# Patient Record
Sex: FEMALE | Race: WHITE
[De-identification: names, ages, dates, MRNs, and addresses within clinical notes are randomized per-mention and may not be internally consistent; named-entity substitution may affect disease eponyms.]

---

## 2017-02-28 ENCOUNTER — HOSPITAL ENCOUNTER (OUTPATIENT)
Dept: HOSPITAL 45 - C.MAMM | Age: 72
Discharge: HOME | End: 2017-02-28
Attending: FAMILY MEDICINE
Payer: COMMERCIAL

## 2017-02-28 DIAGNOSIS — R92.0: ICD-10-CM

## 2017-02-28 DIAGNOSIS — Z12.31: Primary | ICD-10-CM

## 2017-02-28 NOTE — MAMMOGRAPHY REPORT
BILATERAL DIGITAL SCREENING MAMMOGRAM WITH CAD: 2/28/2017

CLINICAL HISTORY: Routine screening.  Patient has no complaints.  





TECHNIQUE: Bilateral CC and MLO views were obtained.  Current study was also evaluated with a Comput
er Aided Detection (CAD) system.  



COMPARISON: 06/19/2009, 03/18/2011, 06/06/2012, 07/21/2014 mammograms from [facility].  



BREAST COMPOSITION:  The tissue of both breasts is almost entirely fatty.  



FINDINGS:  There is a new faint grouping of microcalcifications in the lower inner anterior right br
east, warranting additional spot magnification views.



There are mild vascular calcifications in the breasts.  A few other benign coarse calcifications.  N
o other suspicious mass, architectural distortion or cluster of suspicious microcalcifications is se
en.  



IMPRESSION:  ACR BI-RADS CATEGORY 0: INCOMPLETE EVALUATION:  NEED ADDITIONAL IMAGING EVALUATION

The new faint grouping of microcalcifications in the right medial breast needs additional evaluation
.  

The patient will be called to schedule an appointment.  





Approximately 10% of breast cancers are not detected with mammography. A negative mammographic repor
t should not delay biopsy if a clinically suggestive mass is present.



Elsa Branch M.D.          

ay/:2/28/2017 15:24:06  



Imaging Technologist: Ariella PAN(CHANDAN)(M), [facility]

letter sent: Addl Imaging 0  

BI-RADS Code: ACR BI-RADS Category 0: Incomplete Evaluation:  Need Additional Imaging Evaluation

## 2017-03-10 ENCOUNTER — HOSPITAL ENCOUNTER (OUTPATIENT)
Dept: HOSPITAL 45 - C.MAMM | Age: 72
Discharge: HOME | End: 2017-03-10
Attending: FAMILY MEDICINE
Payer: COMMERCIAL

## 2017-03-10 DIAGNOSIS — R92.8: Primary | ICD-10-CM

## 2017-03-10 DIAGNOSIS — R92.1: ICD-10-CM

## 2017-03-10 NOTE — MAMMOGRAPHY REPORT
UNILATERAL RIGHT DIGITAL DIAGNOSTIC MAMMOGRAM: 3/10/2017

CLINICAL HISTORY: Callback from screening mammogram for right breast calcifications.  





TECHNIQUE:  Spot magnification right cc and ML views were obtained.



COMPARISON: Comparison is made to exams dated:  2/28/2017 mammogram - [facility], 
7/21/2014 mammogram, 6/6/2012 mammogram, and 3/18/2011 mammogram - [facility].   



BREAST COMPOSITION:  There are scattered areas of fibroglandular density in the right breast.  



FINDINGS:  Spot magnification views of the right breast demonstrate new grouped punctate calcificati
ons in the right upper inner quadrant, with total extent of the calcifications measuring approximate
ly 19 mm.  The calcifications are indeterminate and stereotactic biopsy is recommended for further e
valuation.





IMPRESSION:  ACR BI-RADS CATEGORY 4: SUSPICIOUS

Grouped punctate calcifications in the right upper inner quadrant.  The calcifications are indetermi
arianna and stereotactic biopsy is recommended for further evaluation.



A phone call was made to the physician's office to confirm faxed results were received.  The patient
 has been verbally notified of the results.  She tentatively scheduled the biopsy before leaving the
 department.





Approximately 10% of breast cancers are not detected with mammography. A negative mammographic repor
t should not delay biopsy if a clinically suggestive mass is present.



Lise Chaney M.D.          

ah/:3/10/2017 13:52:31  



Imaging Technologist: Elan PAN(CHANDAN)(CINDY), [facility]

letter sent: Abnormal 4/5  

BI-RADS Code: ACR BI-RADS Category 4: Suspicious

## 2017-03-14 NOTE — CODING QUERY NO DIAGNOSIS
TREATMENT RENDERED WITHOUT A DIAGNOSIS                                                  



Dr. Royal,



To promote full compliance with coding requirements relating to patient care, physician 
participation is requested in all cases of  uncertainty.  Please assist us with 
providing a diagnosis/symptom for the test(s) below:



A diagnosis/symptom was not documented on your Order.  A valid diagnosis/symptom is 
required to bill all insurances.



**Please remember that we are unable to code a diagnosis of rule out, probable, possible, 
questionable, or suspected.  



Tests that require a diagnosis:





* UNI RT CALL BACK TOMOSYNTHESIS      DIAGNOSIS:



* DIAG RT CALL BACK W/O CAD          DIAGNOSIS:





***DATE OF SERVICE: 3/10/17***





Provider Signature: _____________________________ Date: _________



Thank you  

Guru Donahue

[facility] Information Management

Phone:  [phone]

Fax:  [phone]



Once completed, please kindly fax back to [phone]



For questions please call [phone]

## 2017-03-22 ENCOUNTER — HOSPITAL ENCOUNTER (OUTPATIENT)
Dept: HOSPITAL 45 - C.MAMM | Age: 72
Discharge: HOME | End: 2017-03-22
Attending: FAMILY MEDICINE
Payer: COMMERCIAL

## 2017-03-22 DIAGNOSIS — R92.0: Primary | ICD-10-CM

## 2017-03-22 DIAGNOSIS — N60.91: ICD-10-CM

## 2017-03-22 NOTE — MAMMOGRAPHY REPORT
THIS REPORT HAS BEEN AMENDED.  

STEREOTACTIC GUIDED BIOPSY RIGHT BREAST: 3/22/2017

CLINICAL HISTORY: Indeterminate calcifications in the right upper inner quadrant.  



PATIENT CONSENT: The procedure, risks, benefits, and alternatives of stereotactic biopsy with clip p
lacement were discussed with the patient, and verbal and written consent was obtained.  A timeout wa
s performed immediately prior to the procedure.  



PROCEDURE DESCRIPTION: With stereotactic guidance, aseptic technique, and lidocaine as a local anest
hetic (1% lidocaine to anesthetize the skin and 1% lidocaine with epinephrine to anesthetize the lidya
per tissues), the area of concern was sampled multiple times with a 9-gauge vacuum-assisted biopsy n
eedle (Suros Eviva).  The path of approach was medial to lateral.  The specimen radiograph demonstra
raz calcifications to be present in the samples.  A metallic marker clip was placed at the biopsy si
te.  This was confirmed on postprocedure mammograms.  Direct pressure was applied at the biopsy site
 and hemostasis was readily achieved.  The patient tolerated the procedure without complication.  
e was given wound care instructions.  





COMPARISON: Comparison is made to exams dated:  3/10/2017 mammogram, 2/28/2017 mammogram - [facility], 7/21/2014 mammogram, and 6/6/2012 mammogram - [facility].   







IMPRESSION: STEREOTACTIC GUIDED BIOPSY

Stereotactic biopsy of indeterminate calcifications in the right upper inner quadrant, with clip jf
cement.  The patient will receive pathology results from her referring provider.





Lise Chaney M.D.  

ah/:3/22/2017 11:22:47  



Imaging Technologist: Elan PAN(R)(M), [facility]









AMENDMENT: 3/30/2017   Lise Chaney M.D. 

The pathology from right breast stereotactic biopsy was reviewed on 3/30/2017.  The pathology showed
 atypical ductal hyperplasia with associated microcalcifications, which is concordant with the imagi
 findings.  Given the presence of atypia, surgical excision is recommended.

## 2017-03-22 NOTE — DISCHARGE INSTRUCTIONS
Discharge Instructions


Procedure


Procedure Date:


Mar 22, 2017.


Reason for visit:


Right Calcs.





Discharge


Discharge Date:


Mar 22, 2017.


Discharge Diagnosis:


status post breast biopsy





Instructions


Activity Recommendations:  Additional Limitations (see below)


Return to School/Work:  no limitations


Recommended Home Diet:  No Limitations


Provider Instructions:





ACTIVITY RECOMMENDATIONS:





*  No lifting, pushing, pulling or exercising the affected side for three days.








RETURN TO SCHOOL/WORK:





*  You may return to work/school after the procedure, but do not perform any 

strenuous


   activities for 24 to 48 hours.








MEDICATIONS:





*  Tylenol (two 325 mg) every four to six hours if needed for mild pain (if not 

allergic to Tylenol).








DIET:





*  Resume previous diet.








SPECIAL CARE INSTRUCTIONS:





*  Keep biopsy site dry for 24 hours.  May shower after 24 hours, but do not 

soak (bathe)


   incision.





*  May remove Tegaderm (plastic patch) tomorrow AFTER showering.





*  Leave the steri-strips on for one week.  Allow the steri-strips to fall off 

by themselves.  


   If not off after one week, you may remove them.  You may place a Bandaid


   crosswise over the strips, if desired.





*  Apply ice 10 minutes on and 10 minutes off as needed.





*  Wear a bra at bedtime to sleep more comfortably for 2-3 days.





*  Your referring physician should have the results after approximately 5 to 7 

business days.





*  Call for unusual bleeding, fever, drainage, etc or if you have any questions 

call


    [phone] during normal business hours or after hours call Dr Chaney, [phone].








FOLLOW UP VISIT:





Follow-up with Referring Physician as scheduled.





Allergies


Coded Allergies:  


     Sulfa Drugs (Verified  Allergy, Intermediate, RASH, 9/27/16)


[facility] Recommendations:


 


Call your doctor if:


*  Temperature above 101 degrees


*  Pain not relieved by pain medicine ordered


*  There is increased drainage or redness from any incision


*  You have any unanswered questions or concerns.





Your Doctors Instructions noted above were prepared by provider Lise Chaney.


Patient Signature Section:


 Patient Instructions Signature Page








Ursula West 











Patient (or Guardian) Signature/Date:____________________________________ I 

have read and understand the instructions given to me by my caregivers.








Caregiver/RN/Doctor Signature/Date:____________________________________








The above-named patient and/or guardian has received patient instructions on 

this date.


























+  Original Patient Signature Page (only) stays with chart.  Please make copy 

for patient.

## 2017-03-22 NOTE — MAMMOGRAPHY REPORT
UNILATERAL RIGHT DIGITAL DIAGNOSTIC MAMMOGRAM: 3/22/2017

CLINICAL HISTORY: Status post stereotactic biopsy of calcifications in the right upper inner quadran
t.  





TECHNIQUE:  Postprocedural right CC and ML views were obtained.



COMPARISON: Comparison is made to exams dated:  3/10/2017 mammogram, 2/28/2017 mammogram - [facility], 7/21/2014 mammogram, 6/6/2012 mammogram, and 3/18/2011 mammogram - [facility].   



BREAST COMPOSITION:  There are scattered areas of fibroglandular density in the right breast.  



FINDINGS:  

A new biopsy marker clip is seen at the site of the biopsied calcifications in the right upper inner
 quadrant.  No significant postbiopsy hematoma is seen.



IMPRESSION:  POST PROCEDURE IMAGING FOR MARKER PLACEMENT

New biopsy marker clip status post stereotactic biopsy of right upper inner quadrant calcifications.
  Pathology results are pending.



Approximately 10% of breast cancers are not detected with mammography. A negative mammographic repor
t should not delay biopsy if a clinically suggestive mass is present.



Lise Chaney M.D.          

ah/:3/22/2017 11:43:36  



Imaging Technologist: Elan PAN(CHANDAN)(M), [facility]



BI-RADS Code: Post Procedure Imaging For Marker Placement

## 2017-05-26 ENCOUNTER — HOSPITAL ENCOUNTER (INPATIENT)
Dept: HOSPITAL 45 - C.ED | Age: 72
LOS: 4 days | Discharge: HOME HEALTH | DRG: 65 | End: 2017-05-30
Attending: HOSPITALIST | Admitting: HOSPITALIST
Payer: COMMERCIAL

## 2017-05-26 VITALS
OXYGEN SATURATION: 96 % | SYSTOLIC BLOOD PRESSURE: 164 MMHG | HEART RATE: 59 BPM | TEMPERATURE: 97.7 F | DIASTOLIC BLOOD PRESSURE: 79 MMHG

## 2017-05-26 VITALS
DIASTOLIC BLOOD PRESSURE: 78 MMHG | OXYGEN SATURATION: 96 % | TEMPERATURE: 97.7 F | HEART RATE: 74 BPM | SYSTOLIC BLOOD PRESSURE: 143 MMHG

## 2017-05-26 VITALS
HEIGHT: 64 IN | WEIGHT: 258.38 LBS | BODY MASS INDEX: 44.11 KG/M2 | HEIGHT: 64 IN | BODY MASS INDEX: 44.11 KG/M2 | WEIGHT: 258.38 LBS

## 2017-05-26 VITALS — OXYGEN SATURATION: 96 % | HEART RATE: 67 BPM

## 2017-05-26 DIAGNOSIS — K21.9: ICD-10-CM

## 2017-05-26 DIAGNOSIS — I71.2: ICD-10-CM

## 2017-05-26 DIAGNOSIS — Z96.653: ICD-10-CM

## 2017-05-26 DIAGNOSIS — I11.0: ICD-10-CM

## 2017-05-26 DIAGNOSIS — I50.32: ICD-10-CM

## 2017-05-26 DIAGNOSIS — G47.33: ICD-10-CM

## 2017-05-26 DIAGNOSIS — I63.40: Primary | ICD-10-CM

## 2017-05-26 DIAGNOSIS — E03.9: ICD-10-CM

## 2017-05-26 LAB
ANION GAP SERPL CALC-SCNC: 19 MMOL/L (ref 16–25)
ANION GAP SERPL CALC-SCNC: 7 MMOL/L (ref 3–11)
BASOPHILS # BLD: 0.09 K/UL (ref 0–0.2)
BASOPHILS NFR BLD: 0.8 %
BUN SERPL-MCNC: 29 MG/DL (ref 7–18)
BUN/CREAT SERPL: 30.2 (ref 10–20)
CA-I BLD-SCNC: 1.19 MMOL/L (ref 1.12–1.32)
CALCIUM SERPL-MCNC: 9.2 MG/DL (ref 8.5–10.1)
CHLORIDE BLD-SCNC: 102 MEQ/L (ref 101–112)
CHLORIDE SERPL-SCNC: 106 MMOL/L (ref 98–107)
CKMB/CK RATIO: 1.7 (ref 0–3)
CO2 SERPL-SCNC: 30 MMOL/L (ref 21–32)
COMPLETE: YES
CREAT BLD-MCNC: 0.8 MG/DL (ref 0.6–1.3)
CREAT CL PREDICTED SERPL C-G-VRATE: 71.1 ML/MIN
CREAT SERPL-MCNC: 0.95 MG/DL (ref 0.6–1.2)
EOSINOPHIL NFR BLD AUTO: 249 K/UL (ref 130–400)
GLUCOSE SERPL-MCNC: 144 MG/DL (ref 70–99)
HCT VFR BLD AUTO: 47 % (ref 37–47)
HCT VFR BLD CALC: 44.4 % (ref 37–47)
HGB BLD-MCNC: 16 G/DL (ref 12–16)
IG%: 0.3 %
IMM GRANULOCYTES NFR BLD AUTO: 35.8 %
INR PPP: 1 (ref 0.9–1.1)
ISTAT CARBON DIOXIDE: 28 MEQ/L (ref 24–31)
LYMPHOCYTES # BLD: 3.92 K/UL (ref 1.2–3.4)
MCH RBC QN AUTO: 34.5 PG (ref 25–34)
MCHC RBC AUTO-ENTMCNC: 35.4 G/DL (ref 32–36)
MCV RBC AUTO: 97.6 FL (ref 80–100)
MONOCYTES NFR BLD: 7.7 %
NEUTROPHILS # BLD AUTO: 1.4 %
NEUTROPHILS NFR BLD AUTO: 54 %
PARTIAL THROMBOPLASTIN RATIO: 0.9
PMV BLD AUTO: 10.7 FL (ref 7.4–10.4)
POTASSIUM SERPL-SCNC: 4.2 MMOL/L (ref 3.5–5.1)
PROTHROMBIN TIME: 10.7 SECONDS (ref 9–12)
RBC # BLD AUTO: 4.55 M/UL (ref 4.2–5.4)
SODIUM BLD-SCNC: 143 MEQ/L (ref 135–144)
SODIUM SERPL-SCNC: 143 MMOL/L (ref 136–145)
WBC # BLD AUTO: 10.95 K/UL (ref 4.8–10.8)

## 2017-05-26 RX ADMIN — RANITIDINE SCH MG: 150 TABLET ORAL at 21:32

## 2017-05-26 RX ADMIN — OXYCODONE HYDROCHLORIDE AND ACETAMINOPHEN SCH MG: 500 TABLET ORAL at 21:31

## 2017-05-26 RX ADMIN — MELOXICAM SCH MG: 7.5 TABLET ORAL at 21:32

## 2017-05-26 RX ADMIN — SODIUM CHLORIDE SCH MLS/HR: 900 INJECTION, SOLUTION INTRAVENOUS at 21:30

## 2017-05-26 NOTE — DIAGNOSTIC IMAGING REPORT
CT SCAN OF THE BRAIN WITHOUT IV CONTRAST



CLINICAL HISTORY: Strokelike symptoms.



COMPARISON STUDY:  No priors.



TECHNIQUE: Unenhanced axial CT scan of the brain is performed from the vertex to

the skull base.



CT DOSE: 537.48 mGy.cm



FINDINGS:



Brain parenchyma: There are age-related involutional changes noting  mild

subcortical and periventricular microangiopathic change. There is no hemorrhage,

mass effect, or evidence of acute territorial ischemia by CT criteria.

Gray-white matter is preserved. No extra-axial fluid collection is seen.



Ventricles, sulci, cisterns: Prominent secondary to involutional change.



Intracranial vasculature: There is atherosclerotic calcification of the

cavernous carotid and vertebral arteries.



Calvarium: Unremarkable.



Sinuses and mastoids: The visualized paranasal sinuses are clear. The mastoid

air cells are well pneumatized.



Orbits: The bony orbits are grossly intact.





IMPRESSION: There is no hemorrhage, mass effect, or evidence of acute

territorial ischemia by CT criteria.







Electronically signed by:  Marshall Mayo M.D.

5/26/2017 2:21 PM



Dictated Date/Time:  5/26/2017 2:19 PM

## 2017-05-26 NOTE — HISTORY & PHYSICAL EXAMINATION
DATE OF ADMISSION:  05/26/2017

 

CHIEF COMPLAINT:  CVA.

 

HISTORY OF PRESENT ILLNESS:  This is a 72-year-old female with past medical

history significant for diastolic CHF, hypertension, history of gout,

hypothyroidism, GERD, aneurysm of ascending aorta, diverticular distal colon,

moderate obstructive sleep apnea, irritable bowel syndrome, presents with

garbled speech, right-sided facial droop and right-sided weakness.  The

symptoms started around 12 o'clock and patient initially felt weak in both

extremities.  When the family talked to her on the phone, she was having

garbled speech and she was brought in here and stroke alert was called and

initial CAT scan of the head was unremarkable.  Initially, the symptoms of

weakness improved and speech seemed to be improved . [place] Neurologist 
determined not to

give TPA because of her improving symptoms and because of her ascending

aortic aneurysm.  Currently after the patient got a dose of Ativan, 

patient's speech seems more garbled, but she is able to obey commands and

seems to understand the questions except for mild pronator drift in right

upper extremity, rest of the exams seems nonfocal.  Denies any chest pain, no

shortness of breath.  Denies any and nausea and denies any abdominal pain. 

Hemodynamically stable, afebrile.

 

ALLERGIES:  SULFA DRUGS.

 

PAST MEDICAL HISTORY:  As mentioned above.

 

PAST SURGICAL HISTORY:  Bilateral total knee arthroplasty, carpal tunnel

surgery, EGD with biopsies, excision of the breast lesion, radical

thyroid surgery for benign tumor, cholecystectomy, status post surgery for

colon perforation, total abdominal hysterectomy and bilateral

salpingo-oophorectomy and omentectomy for uterine cancer, umbilical hernia

repair.

 

MEDICATIONS:  The patient is on Norco 5 mg 1-2 tablets every 6 hours

p.r.n., lisinopril 40 mg p.o. daily, levothyroxine 125 mcg p.o. daily,

atenolol 50 mg p.o. daily, ranitidine 300 mg p.o. at bedtime, Lasix 40 mg

p.o. daily, potassium chloride ER 10 mEq p.o. daily, Meloxicam 50 mg p.o.

daily, omega 3 fatty acids 1 gram p.o. daily, Flonase 2 sprays in each

nostril daily, aspirin 81 mg p.o. daily, Ambien 5 mg p.o. at bedtime p.r.n.,

vitamin C 500 mg p.o. daily.

 

FAMILY HISTORY:  Significant for father had prostate cancer, hypertension. 

Mother had DVTs and hypertension.  Son has hypertension.  Sister has stomach

cancer.

 

SOCIAL HISTORY:  , lives with her .  Never smoked.  No alcohol

use.  No drug use.

 

REVIEW OF SYMPTOMS:  As per HPI.  Rest of review of symptoms negative.

 

PHYSICAL EXAMINATION:

GENERAL:  The patient is morbidly obese, currently having garbled speech.

VITAL SIGNS:  Temperature 37, pulse 73, respiratory rate 20, blood pressure

185/105 oxygen 98% on room air.

HEENT:  No pallor, no icterus.  Pupils equal, round, and reactive to light.

NECK:  No JVD, no neck masses, no carotid bruits.

CARDIOVASCULAR:  S1, S2 heard, regular rate and rhythm, no murmur, no gallop.

RESPIRATORY SYSTEM:  Normal AP diameter.  No accessory muscle use.  No

wheezing, no crackles.

ABDOMEN:  Soft, bowel sounds present.  Nontender.  No distention.

CENTRAL NERVOUS SYSTEM:  Speech is garbled.  Obeys commands strength 5/5

in the extremities.  Can elevate both the lower extremities.  Has mild

pronator drift on the right upper extremity.  Babinski negative.

EXTREMITIES:  Lower extremities, no edema, no erythema.

 

LABORATORIES:  Sodium 143, potassium 4.2, chloride 106, CO2 30, BUN 29,

creatinine 0.9, serum glucose 144, calcium 9.2.  Total creatine kinase 134. 

Troponin I less than 0.015.  WBC 10.9, hemoglobin 15.7, hematocrit 44.4,

platelets 249.  PT 10.7, INR 1, PTT 24.3.  Urinalysis is pending.

 

IMAGING DATA:  CT of the head - no hemorrhage, mass effect, or evidence of

acute ischemia by CT criteria.  Chest x-ray - mild  cardiomegaly,

otherwise no acute findings.  EKG showed normal sinus rhythm with rate of

62 no acute ST changes seen.

 

ASSESSMENT AND PLAN:  This 72-year-old female who presents with

cerebrovascular accident.

1.  Acute cerebrovascular accident involving right extremities and right facia 
droop and

garbled speech.  The patient still has a right facial droop and garbled

speech, but her weakness has improved.  Stroke alert was called in ER but TPA 
was 

not given secondary to her history of ascending thoracic aneurysm and as 
symptoms seemed

 improving.The patient was loaded with Plavix.  Continued home dose

of aspirin.  Permissive hypertension.  Neuro checks.  Will admit to tele floor.

MRI and MRA of the head, echocardiogram, carotid Dopplers.  Neurology

consulted and notified.  Speech evaluation and PT, OT. Gentle fluids. Close 
monitor on tele

floor for now.  

3.  History of sleep apnea.  Continue CPAP.

4.  History for hypertension.  gallito hold lisinopril for permissive HTN. continue 
atenolol.Hydralazine prn

5.  Hypothyroidism.  Continue Synthroid.

6.  History of gastroesophageal reflux disease, continue Zantac.

7.  History of diastolic congestive heart failure.  Continue her Lasix.  Will

monitor for any volume overload.

8.  Deep venous thrombosis prophylaxis, sequential compression devices for

now.

 

DISPOSITION:  Close monitor in tele floor.  Physical therapy and occupational

therapy prior to discharge.  Social Service to help with discharge planning. 

Level 1 full code.

 

 

 

MTDD

## 2017-05-26 NOTE — DIAGNOSTIC IMAGING REPORT
MRA OF THE INTRACRANIAL CIRCULATION WITHOUT CONTRAST



CLINICAL HISTORY: Stroke.    



COMPARISON STUDY: Head CT performed earlier today.



TECHNIQUE: Utilizing a 1.5 Farida magnet and 3-D time-of-flight technique,

unenhanced MRA of the intracranial circulation was obtained. 



FINDINGS: This exam is mildly compromised by motion artifact. No abrupt vessel

cut off is identified within the intracranial circulation. There is no

intracranial aneurysm although sensitivity for detection of small aneurysms is

diminished on this exam. There is mild intracranial vascular irregularity. There

is no severe stenosis within the intracranial vessels.



IMPRESSION:  



1. No abrupt vessel cut off. No aneurysm identified although sensitivity

diminished due to motion artifact.



2. Study mildly compromised by motion artifact.







Electronically signed by:  Aden Lizarraga M.D.

5/26/2017 8:38 PM



Dictated Date/Time:  5/26/2017 8:13 PM

## 2017-05-26 NOTE — DIAGNOSTIC IMAGING REPORT
MRA OF THE NECK WITH AND WITHOUT CONTRAST



CLINICAL HISTORY: Stroke    



COMPARISON STUDY: Carotid ultrasound May 26, 2017.



TECHNIQUE: Unenhanced and contrast-enhanced MRA of the neck was performed.

Injection of 12.5 mL of Magnevist IV was uneventful.  NASCET criteria were

utilized to estimate the degree of carotid stenosis. 



FINDINGS: This study is mildly compromised by motion artifact but is diagnostic.

There is no significant stenosis within the bilateral vertebral, common carotid

and internal carotid artery. These vessels are patent. There is mild

irregularity of the vertebral arteries which is likely due to atherosclerosis.

There is no evidence for dissection.



IMPRESSION:  No significant stenosis within the major vasculature of the neck.







Electronically signed by:  Aden Lizarraga M.D.

5/26/2017 8:38 PM



Dictated Date/Time:  5/26/2017 8:35 PM

## 2017-05-26 NOTE — DIAGNOSTIC IMAGING REPORT
CAROTID ARTERY ULTRASOUND



CLINICAL HISTORY: Stroke    



COMPARISON STUDY: None.



TECHNIQUE: Real-time, grayscale, and color Doppler sonography of the carotid and

vertebral arteries was performed. Images were viewed in the transverse and

longitudinal planes.



FINDINGS:



There is mild atherosclerotic plaque. Velocity measurements are listed below.



COMMON CAROTID PEAK SYSTOLIC VELOCITY (CM/S):  RIGHT 95  LEFT 68



ICA PEAK SYSTOLIC VELOCITY (CM/S):  RIGHT 86  LEFT 69





The systolic ratios between the internal to common carotid arteries are normal.





Antegrade flow is seen in the vertebral arteries. The external carotid arteries

are patent.



Blood pressure in the right arm measured 142/71.  Blood pressure in the left arm

measured 151/66.



IMPRESSION:  No evidence of a hemodynamically significant stenosis.







Electronically signed by:  Aden Lizarraga M.D.

5/26/2017 7:08 PM



Dictated Date/Time:  5/26/2017 7:07 PM

## 2017-05-26 NOTE — DIAGNOSTIC IMAGING REPORT
CHEST ONE VIEW PORTABLE



CLINICAL HISTORY: Stroke mental status change



COMPARISON STUDY:  9/27/2016



FINDINGS: Mild stable cardiomegaly. Diaphragms smooth. Lungs are clear. 



IMPRESSION:  Mild stable cardiomegaly. Otherwise negative study 









Electronically signed by:  Junior Hennessy M.D.

5/26/2017 2:52 PM



Dictated Date/Time:  5/26/2017 2:52 PM

## 2017-05-26 NOTE — DIAGNOSTIC IMAGING REPORT
MRI OF THE BRAIN WITHOUT AND WITH IV CONTRAST



CLINICAL HISTORY: Stroke.    



COMPARISON STUDY:  Head CT performed earlier today. 



TECHNIQUE:  Utilizing a 1.5 Farida magnet and dedicated coil, multiplanar,

multiecho imaging of the brain was performed pre and postcontrast

administration.  IV administration of 12 mL of Gadavist contrast was uneventful.



FINDINGS: There is a 2.7 x 1.5 cm focus of restricted diffusion within the left

frontotemporal region. There are a few smaller foci of restricted diffusion

within the left occipital lobe. There is no mass effect or evidence of

hemorrhagic conversion. Ventricular system is normal. Basilar cisterns are

patent. There are no extra-axial collections. Scattered white matter T2

hyperintense foci suggest moderate small vessel disease. There is no

intracranial mass. There is smooth diffuse dural enhancement. There is a 1.5 cm

right parietal scalp lesion which may demonstrate mild enhancement. 



IMPRESSION:  



1. 2.7 x 1.5 cm focus of restricted diffusion within left frontotemporal region

consistent with acute infarct. Several punctate foci of acute infarction within

the left occipital lobe. No mass effect or evidence of hemorrhagic conversion.



2. Mild diffuse smooth dural enhancement. This finding is entirely nonspecific

and of uncertain clinical significance. This could be seen in setting of

intracranial hypotension. However, the differential also includes neoplastic,

infectious and granulomatous processes.



3. Indeterminate 1.5 cm right parietal scalp lesion which could be correlated

with physical exam.







Electronically signed by:  Aden Lizarraga M.D.

5/26/2017 8:39 PM



Dictated Date/Time:  5/26/2017 8:25 PM

## 2017-05-26 NOTE — EMERGENCY ROOM VISIT NOTE
History


Report prepared by Sekouibphylicia:  Jose Alfredo Camargo


Under the Supervision of:  Dr. Jennifer Snell M.D.


First contact with patient:  14:03


Chief Complaint:  STROKE SYMPTOMS


Stated Complaint:  STROKE ALERT





History of Present Illness


The patient is a 72 year old female who presents to the Emergency Room with 

complaints of persistent stroke-like symptoms since 1215. The patient 

reportedly had right-sided extremity weakness, a right-sided facial droop, and 

garbled speech. The patient feels that her symptoms have improved slightly 

since arrival to the ED. Per nursing, the patient had one baby aspirin earlier 

today.





   Source of History:  patient


   Onset:  1215 today


   Position:  other (neuro)


   Quality:  other (stroke-like symptoms)


   Timing:  other (persistent)


   Associated Symptoms:  + weakness





Review of Systems


See HPI for pertinent positives & negatives. A total of 10 systems reviewed and 

were otherwise negative.





Past Medical & Surgical


Medical Problems:


(1) Ascending aortic aneurysm


(2) Chest pain


(3) CVA (cerebral vascular accident)


(4) Diastolic heart failure


(5) Dyslipidemia


(6) HTN (hypertension)


(7) Hypothyroidism


(8) ROB on CPAP


Surgical Problems:


(1) H/O thyroidectomy


(2) H/O umbilical hernia repair


(3) History of bilateral knee arthroplasty


(4) History of carpal tunnel surgery


(5) History of cholecystectomy


(6) History of partial colectomy


(7) History of total hysterectomy








Family History





No pertinent family history





Social History


Smoking Status:  Unknown if Ever Smoked


Alcohol Use:  none


Drug Use:  none


Marital Status:  


Housing Status:  lives with significant other


Occupation Status:  retired





Current/Historical Medications


Scheduled


Ascorbic Acid (Ascorbic Acid), 500 MG PO HS


Aspirin (Aspirin EC Low Dose), 81 MG PO DAILY


Atenolol (Tenormin), 50 MG PO HS


Fish Oil ([place]-3), 1 CAP PO DAILY


Fluticasone Propionate (Nasal) (Flonase Allergy Relief Ch), 2 SPRAYS INH DAILY


Furosemide (Lasix), 40 MG PO DAILY


Levothyroxine Sodium (Synthroid), 175 MCG PO QAM


Lisinopril (Lisinopril), 40 MG PO DAILY


Meloxicam (Mobic), 1 TAB PO HS


Potassium Chloride (Potassium Chloride Er), 1 CAP PO DAILY


Ranitidine (Zantac), 300 MG PO HS





Scheduled PRN


Zolpidem Tartrate (Ambien), 5 MG PO HS PRN for Insomnia





Allergies


Coded Allergies:  


     Sulfa Antibiotics (Verified  Allergy, Intermediate, RASH, 5/26/17)





Physical Exam


Vital Signs











  Date Time  Temp Pulse Resp B/P Pulse Ox O2 Delivery O2 Flow Rate FiO2


 


5/26/17 16:12  73 20 134/65 97 Room Air  


 


5/26/17 16:01  73 20 170/69 98 Room Air  


 


5/26/17 15:19  71 20 185/105 98 Room Air  


 


5/26/17 14:51  74 20 183/82 99 Room Air  


 


5/26/17 14:34     99 Room Air  


 


5/26/17 14:24  76      


 


5/26/17 14:10 37.0 74 20 173/96 99 Room Air  











Physical Exam


Vital signs reviewed.





General: Well-appearing female, in no significant distress. Noted to be 

slightly hypertensive.





HEENT: No scleral icterus, PERRLA, neck supple.  Atraumatic.





Cardiovascular: Regular rate and rhythm, no extra sounds.





Pulmonary: Clear to auscultation bilaterally, normal work of breathing.





Abdomen: Soft, nontender, nondistended, positive bowel sounds.





Musculoskeletal: Atraumatic, no peripheral edema.





Neurologic: Equal strength in all 4 extremities with equal  strength, no 

pronator drift, normal finger to nose exam, normal heel to shin exam, cranial 

nerve examination reveals mild labionasal folding on the right with some 

dysarthria, answers questions appropriately, equal  strength.





Skin: Warm, dry, no rash





Medical Decision & Procedures


ER Provider


Diagnostic Interpretation:


Radiology results as stated below per my review and radiologist interpretation:





CT SCAN OF THE BRAIN WITHOUT IV CONTRAST





CLINICAL HISTORY: Strokelike symptoms.





COMPARISON STUDY:  No priors.





TECHNIQUE: Unenhanced axial CT scan of the brain is performed from the vertex to


the skull base.





CT DOSE: 537.48 mGy.cm





FINDINGS:





Brain parenchyma: There are age-related involutional changes noting  mild


subcortical and periventricular microangiopathic change. There is no hemorrhage,


mass effect, or evidence of acute territorial ischemia by CT criteria.


Gray-white matter is preserved. No extra-axial fluid collection is seen.





Ventricles, sulci, cisterns: Prominent secondary to involutional change.





Intracranial vasculature: There is atherosclerotic calcification of the


cavernous carotid and vertebral arteries.





Calvarium: Unremarkable.





Sinuses and mastoids: The visualized paranasal sinuses are clear. The mastoid


air cells are well pneumatized.





Orbits: The bony orbits are grossly intact.








IMPRESSION: There is no hemorrhage, mass effect, or evidence of acute


territorial ischemia by CT criteria.











Electronically signed by:  Marshall Mayo M.D.


5/26/2017 2:21 PM





Dictated Date/Time:  5/26/2017 2:19 PM








CHEST ONE VIEW PORTABLE





CLINICAL HISTORY: Stroke mental status change





COMPARISON STUDY:  9/27/2016





FINDINGS: Mild stable cardiomegaly. Diaphragms smooth. Lungs are clear. 





IMPRESSION:  Mild stable cardiomegaly. Otherwise negative study 














Electronically signed by:  Junior Hennessy M.D.


5/26/2017 2:52 PM





Dictated Date/Time:  5/26/2017 2:52 PM





Laboratory Results











Test


  5/26/17


13:45 5/26/17


14:15 5/26/17


14:22 5/26/17


14:23


 


Total Creatine Kinase


  134 U/L


() 


  


  


 


 


Creatine Kinase MB


  2.3 ng/ml


(0.5-3.6) 


  


  


 


 


Creatine Kinase MB Ratio 1.7 (0-3.0)    


 


Bedside Hemoglobin


  


  16.0 g/dl


(12.0-16.0) 


  


 


 


Bedside Hematocrit  47 % (37-47)   


 


Bedside Sodium


  


  143 mEq/L


(135-144) 


  


 


 


Bedside Potassium


  


  4.2 mEq/L


(3.3-5.0) 


  


 


 


Bedside Chloride


  


  102 mEq/L


(101-112) 


  


 


 


Bedside Total CO2


  


  28 mEq/l


(24-31) 


  


 


 


Bedside Blood Urea Nitrogen


  


  30 mg/dl


(7-18) 


  


 


 


Bedside Creatinine


  


  0.8 mg/dl


(0.6-1.3) 


  


 


 


Bedside Glucose (other)


  


  144 mg/dl


(70-99) 


  


 


 


Bedside Ionized Calcium (Dom)


  


  1.19 mmol/l


(1.12-1.32) 


  


 


 


Bedside Prothrombin Time INR   0.9 (0.9-1.1)  


 


Bedside Glucose


  


  


  


  106 mg/dl


(70-90)














Test


  5/26/17


15:40 


  


  


 


 


Prothrombin Time


  10.7 SECONDS


(9.0-12.0) 


  


  


 


 


Prothromb Time International


Ratio 1.0 (0.9-1.1) 


  


  


  


 


 


Activated Partial


Thromboplast Time 24.3 SECONDS


(21.0-31.0) 


  


  


 


 


Partial Thromboplastin Ratio 0.9    





Laboratory results per my review.





Medications Administered











 Medications


  (Trade)  Dose


 Ordered  Sig/Tory


 Route  Start Time


 Stop Time Status Last Admin


Dose Admin


 


 Sodium Chloride


  (Nss 1000ml)  1,000 ml @ 


 50 mls/hr  Q20H


 IV  5/26/17 14:03


 5/26/17 18:32 DC 5/26/17 14:46


50 MLS/HR


 


 Clopidogrel


 Bisulfate


  (plAVix TAB)  300 mg  NOW  ONCE


 PO  5/26/17 15:30


 5/26/17 15:31 DC 5/26/17 15:45


300 MG


 


 Lorazepam


  (Ativan Inj)  1 mg  NOW  STAT


 IV  5/26/17 15:21


 5/26/17 15:23 DC 5/26/17 15:46


1 MG











ECG


Indication:  weakness


Rate (beats per minute):  71


Rhythm:  normal sinus


Findings:  no acute ischemic change, no ectopy





ED Course


1403: NSS 1000 ml @ 50 mls/hr ordered.





1410: The patient was taken directly to CT scan.





1418: Past medical records reviewed. The patient was evaluated in room B1. A 

complete history and physical examination was performed. 





1433: Discussed the case with Dr. Houser [place] Stroke Neurologist. The 

patient will be evaluated over the Tele-Stroke.





1515: Dr. Houser does not recommend tPA.





1520: Discussed the case with ANNIE Staples, [facility] Hospitalist. The 

patient will be evaluated.





1530: Plavix 300 mg PO.





Medical Decision


Differential diagnosis:


Etiologies such as metabolic, infection, hypo/hyperglycemia, electrolyte 

abnormalities, cardiac sources, intracerebral event, toxicologic, neurologic, 

as well as others were entertained. 








This patient was evaluated and appeared to be in no significant distress.  IV 

access was obtained and laboratory work was drawn.  Patient's physical exam 

reveals garbled speech, although she has awake and oriented.  She has some mild 

right facial droop.  There is no appreciable weakness to the right upper or 

right lower extremity.  Patient seems to be out of follow commands 

appropriately.  A stroke consult was obtained through telemetry stroke and due 

to the patient's improving symptoms and thoracic aortic aneurysm, the 

neurologist, Dr. Houser did not recommend TPA.  Patient was given Plavix per 

his direction.  IV hydration was continued.  Patient's blood pressure was 

permissible secondary to the presumed ischemic infarct.  The hospitalist was 

consulted who evaluated the patient for admission and further management.  

Patient family are aware of the plan and agree.





Consults


Time Called:  1425


Consulting Physician:  Discussed the case with Dr. Houser [place] [facility] 

Neurologist.


Returned Call:  1433


The patient will be evaluated over the Tele-Stroke.


Additional Consults:  


   Time Called:  1519


   Consulted Physician:  ANNIE Staples, [facility] Hospitalist.


   Returned Call:  1520


Additional Comments:


The patient will be evaluated.





Impression





 Primary Impression:  


 Ischemic stroke





Scribe Attestation


The scribe's documentation has been prepared under my direction and personally 

reviewed by me in its entirety. I confirm that the note above accurately 

reflects all work, treatment, procedures, and medical decision making performed 

by me.





Departure Information


Dispostion


Being Evaluated By Hospitalist





Prescriptions





Zolpidem Tartrate (AMBIEN) 5 Mg Tab


5 MG PO HS Y for Insomnia, #20 TAB


   Prov: Isai Alvarado MD         5/26/17 


Furosemide (LASIX) 40 Mg Tab


40 MG PO DAILY, #30 TAB


   Prov: Isai Alvarado MD         5/26/17





Referrals


Koby Royal, D.O. (PCP)





Forms


HOME CARE DOCUMENTATION FORM,                                                 

               IMPORTANT VISIT INFORMATION





Patient Instructions


My [facility]





Stroke History


Time Last Known Well


1215





Stroke t-PA Criteria Reviewed


Does NOT meet criteria for t-PA





Reason t-PA Not Given


Treatment not indicated

## 2017-05-27 VITALS
SYSTOLIC BLOOD PRESSURE: 145 MMHG | TEMPERATURE: 98.24 F | DIASTOLIC BLOOD PRESSURE: 46 MMHG | OXYGEN SATURATION: 95 % | HEART RATE: 71 BPM

## 2017-05-27 VITALS — HEART RATE: 77 BPM | SYSTOLIC BLOOD PRESSURE: 150 MMHG | DIASTOLIC BLOOD PRESSURE: 82 MMHG | OXYGEN SATURATION: 96 %

## 2017-05-27 VITALS
DIASTOLIC BLOOD PRESSURE: 73 MMHG | SYSTOLIC BLOOD PRESSURE: 144 MMHG | HEART RATE: 57 BPM | OXYGEN SATURATION: 95 % | TEMPERATURE: 97.7 F

## 2017-05-27 VITALS
SYSTOLIC BLOOD PRESSURE: 141 MMHG | HEART RATE: 64 BPM | OXYGEN SATURATION: 98 % | TEMPERATURE: 97.88 F | DIASTOLIC BLOOD PRESSURE: 82 MMHG

## 2017-05-27 VITALS
TEMPERATURE: 97.52 F | SYSTOLIC BLOOD PRESSURE: 110 MMHG | DIASTOLIC BLOOD PRESSURE: 53 MMHG | HEART RATE: 61 BPM | OXYGEN SATURATION: 96 %

## 2017-05-27 VITALS
DIASTOLIC BLOOD PRESSURE: 87 MMHG | TEMPERATURE: 97.7 F | HEART RATE: 67 BPM | OXYGEN SATURATION: 95 % | SYSTOLIC BLOOD PRESSURE: 164 MMHG

## 2017-05-27 LAB
ANION GAP SERPL CALC-SCNC: 7 MMOL/L (ref 3–11)
BASOPHILS # BLD: 0.07 K/UL (ref 0–0.2)
BASOPHILS NFR BLD: 0.8 %
BUN SERPL-MCNC: 23 MG/DL (ref 7–18)
BUN/CREAT SERPL: 33 (ref 10–20)
CALCIUM SERPL-MCNC: 8.6 MG/DL (ref 8.5–10.1)
CHLORIDE SERPL-SCNC: 108 MMOL/L (ref 98–107)
CHOLEST/HDLC SERPL: 3.7 {RATIO}
CO2 SERPL-SCNC: 29 MMOL/L (ref 21–32)
COMPLETE: YES
CREAT CL PREDICTED SERPL C-G-VRATE: 95.9 ML/MIN
CREAT SERPL-MCNC: 0.69 MG/DL (ref 0.6–1.2)
EOSINOPHIL NFR BLD AUTO: 250 K/UL (ref 130–400)
EST. AVERAGE GLUCOSE BLD GHB EST-MCNC: 117 MG/DL
GLUCOSE SERPL-MCNC: 104 MG/DL (ref 70–99)
GLUCOSE UR QL: 43 MG/DL
HCT VFR BLD CALC: 44.1 % (ref 37–47)
IG%: 0.3 %
IMM GRANULOCYTES NFR BLD AUTO: 36.9 %
KETONES UR QL STRIP: 84 MG/DL
LYMPHOCYTES # BLD: 3.21 K/UL (ref 1.2–3.4)
MCH RBC QN AUTO: 32.4 PG (ref 25–34)
MCHC RBC AUTO-ENTMCNC: 32.9 G/DL (ref 32–36)
MCV RBC AUTO: 98.4 FL (ref 80–100)
MONOCYTES NFR BLD: 9.9 %
NEUTROPHILS # BLD AUTO: 2.2 %
NEUTROPHILS NFR BLD AUTO: 49.9 %
NITRITE UR QL STRIP: 149 MG/DL (ref 0–150)
PH UR: 157 MG/DL (ref 0–200)
PMV BLD AUTO: 10.4 FL (ref 7.4–10.4)
POTASSIUM SERPL-SCNC: 4.3 MMOL/L (ref 3.5–5.1)
RBC # BLD AUTO: 4.48 M/UL (ref 4.2–5.4)
SODIUM SERPL-SCNC: 144 MMOL/L (ref 136–145)
VERY LOW DENSITY LIPOPROT CALC: 30 MG/DL
WBC # BLD AUTO: 8.71 K/UL (ref 4.8–10.8)

## 2017-05-27 RX ADMIN — SODIUM CHLORIDE SCH MLS/HR: 900 INJECTION, SOLUTION INTRAVENOUS at 23:16

## 2017-05-27 RX ADMIN — CLOPIDOGREL BISULFATE SCH MG: 75 TABLET, FILM COATED ORAL at 11:47

## 2017-05-27 RX ADMIN — MELOXICAM SCH MG: 7.5 TABLET ORAL at 21:12

## 2017-05-27 RX ADMIN — ATORVASTATIN CALCIUM SCH MG: 40 TABLET, FILM COATED ORAL at 11:46

## 2017-05-27 RX ADMIN — LEVOTHYROXINE SODIUM SCH MCG: 175 TABLET ORAL at 04:56

## 2017-05-27 RX ADMIN — FLUTICASONE PROPIONATE SCH SPRAYS: 50 SPRAY, METERED NASAL at 09:00

## 2017-05-27 RX ADMIN — POTASSIUM CHLORIDE SCH MEQ: 750 TABLET, EXTENDED RELEASE ORAL at 11:47

## 2017-05-27 RX ADMIN — SODIUM CHLORIDE SCH MLS/HR: 900 INJECTION, SOLUTION INTRAVENOUS at 09:29

## 2017-05-27 RX ADMIN — SODIUM CHLORIDE SCH MLS/HR: 900 INJECTION, SOLUTION INTRAVENOUS at 19:45

## 2017-05-27 RX ADMIN — Medication SCH MG: at 11:46

## 2017-05-27 RX ADMIN — Medication SCH GM: at 11:49

## 2017-05-27 RX ADMIN — OXYCODONE HYDROCHLORIDE AND ACETAMINOPHEN SCH MG: 500 TABLET ORAL at 21:11

## 2017-05-27 RX ADMIN — RANITIDINE SCH MG: 150 TABLET ORAL at 21:10

## 2017-05-27 NOTE — CONSULTATION REPORT
DATE OF CONSULTATION:  05/27/2017

 

REQUESTING PHYSICIAN:  Dr. Alvarado.

 

HISTORY OF PRESENT ILLNESS:  Ursula is 72 years old, has a past medical

history of diastolic congestive heart failure, hypertension, gout,

hypothyroidism, GERD, and ascending aortic artery aneurysm, diverticular

disease of the colon, moderate obstructive sleep apnea, irritable bowel

syndrome and had recent breast carcinoma surgery about 2 weeks ago for which

she was transiently off aspirin and is now back on it.

 

Yesterday she developed garbled speech, some word finding deficits,

right-sided facial droop and some transient right-sided weakness which began

about 1200 hours.  She was brought to the Emergency Room, stroke alert was

called, but by that time she was beginning to improve.  CAT scan showed no

hemorrhage and because of thoracic aortic aneurysm and the fact she was

improving, it was elected not to place her on TPA.  She did receive some

Ativan to have some imaging studies done and got transiently worse in terms

of her speech, but now is back to her baseline.  According to family, she is

about the same as she was yesterday,or is slightly better.

 

The exam describes a mild word finding issue yesterday, dysarthria and some

right facial weakness with some drift to the right arm and some of these

aspects are better today.

 

PAST MEDICAL HISTORY:  As noted above.

 

MEDICATIONS AT HOME:  Include Norco one 5 mg tablet every 6 hours as needed,

lisinopril, levothyroxine, atenolol, ranitidine, Lasix, potassium chloride,

Meloxicam, omega-3 fatty acids, Flonase, aspirin, Ambien, vitamin C.

 

ALLERGIES:  SHE HAS ALLERGIES TO SULFA.

 

PAST SURGICAL HISTORY:  In addition to the recent breast cancer reveals

bilateral total knee replacements, carpal tunnel surgery, EGD with biopsies,

excision of the breast lesion recently, radical thyroid surgery for benign

tumor, cholecystectomy and has had surgery for colonic perforations, a total

abdominal hysterectomy and bilateral salpingo-oophorectomy for uterine cancer

and has had umbilical hernia repairs.

 

FAMILY HISTORY:  Positive for prostate cancer, hypertension, DVT.  Sister has

stomach cancer.

 

SOCIAL HISTORY:  Reveals her to be .  She lives with her .  She

has several children.  No alcohol use.  Never has smoked.

 

REVIEW OF SYSTEMS:  Reveals some episodic lightheadedness, the recent breast

surgery but otherwise no fevers, sweats, chills.  No new issues referable to

head, eyes, ears, nose and throat, cardiovascular, pulmonary,

gastrointestinal, genitourinary, musculoskeletal systems other than those

described above in relation to her past medical history, her recent breast

surgery and the current history which is consistent with a CVA.

 

PHYSICAL EXAMINATION:

GENERAL:  Yesterday the patient was found to be moderately obese.

VITAL SIGNS:  She had a temperature of 37, pulse rate was 73 and regular,

respirations were 20, blood pressure was 195/105.

HEENT:  Revealed no deformities.  Normal extraocular movements.  Fundi were

poorly seen.

NECK:  Supple.  No bruits were heard.

LUNGS:  Clear.

HEART:  Had a regular rhythm.  No murmurs were appreciated.

ABDOMEN:  Obese.  No organomegaly was appreciated.

EXTREMITIES:  Free of edema and pulses were all present.

NEUROLOGIC:  Today neurologically she is awake, alert, oriented.  She can

repeat phrases but has to pronounce her words very slowly.  She can read. 

She can name objects, but again in a dysarthric fashion.  I do not 

any neologisms, word substitutions, paraphrases, etc.  There is a right upper

motor neuron facial paresis.  Eye movements are normal.  Visual fields are

full.  There is no neglect.  Facial sensation is normal.  Tongue protrudes in

the midline.  There is a very minor drift in the right arm and slight reduced

facility of rapid repetitive motions in the right.  Otherwise, motor system

examination is normal in terms of strength, reflexes are hypoactive

throughout.  Toes are downgoing.  There is no Mario's sign clearly on the

right despite the subtle upper motor neuron lesions described on exam and

sensory examination reveals some mild vibratory loss distally in the lower

extremities.  There is no primary sensory loss on the right and no neglect.

 

IMAGING STUDIES:  Have shown presumptive embolic infarctions in the left

middle cerebral artery distribution and in the left occipital pole likely in a

posterior cerebral distribution.  Unfortunately, the actual lorena images of the 
brain

parenchyma are not available.  An MRA of the [place] of [place] and cervical

vessels is essentially unremarkable allowing for some minor atheromatous

changes and carotid duplex shows only minor changes, nothing of significance.

 An echocardiogram is ordered but has not been done.  EKG and monitoring has

shown no evidence for atrial fibrillation.

 

ASSESSMENT AND PLAN:  At this point, this woman has what I think are multiple

embolic infarctions involving 2 arterial distributions both anteriorly and

posteriorly and the source of this would have to be either in the aorta or in

the cardiac chambers.  We need a little more definition of the anatomy of

the aortic aneurysm.  This could be a source of some laminated  mural clot.  
Cardiac

chambers could be involved, and there could also be some paroxysmal atrial

fibrillation.  Because of all of these concerns, I think cardiology needs to

get involved beyond the performance of the echocardiogram and they may want

to do a transesophageal echo just to define the aortic anatomy and valvular

anatomy more thoroughly and to be certain this is not a PFO, which might have

significance in light of the recent breast surgery with perhaps some more 
sedentary existence and in lihgt of her chronic illnesses in which her activity 
levels may be low and pedispose her to venous or even pelvic dvt. 

 

Right now, I agree totally with adding Plavix to her aspirin.  Again, I would

wait for the echo and I would like to get cardiology's opinion about what is

potential sources of emboli they might be able to detect.

 

 

 

ALEXNADRO

## 2017-05-27 NOTE — PROGRESS NOTE
Internal Med Progress Note


Date of Service:


May 27, 2017.


Provider Documentation:





SUBJECTIVE:





speech is better today


still has mild right facial droop and mild weakness in right upper extremity 

weakness


denies chest pain or sob


afebrile











OBJECTIVE:





Vital Signs-as noted below





Exam:


General-alert and awake and oriented x 3. 


ENT-Normal hearing


Neck-no neck masses


Lungs-cta b/l no wheezing or crackles


Heart-s1 and s2 heard regular rate and rhythm no murmurs


Abdomen-soft bowel sounds present non tender no distension 


Extremities- no present no erythema


Neuro-alert and awake oriented 


mild right facial droop


mild right upper extremity pronator drift


moves extremities


Lab data as noted below.


ASSESSMENT & PLAN:


This 72-year-old female who presents with


cerebrovascular accident.


1.  Acute cerebrovascular accident involving right extremities and right facia 

droop and


garbled speech.  TPA not given because of thoracic aortic aneurysm and symptoms 

improving.


speech much improved


mild weakness in right upper extremity.


MRI head shows acute infarct in left frontoparietal region and left occipital 

region


mostly embolic stroke


Neurology recommends DENIS and evaluation of thoracic aortic aneurysm


cardiology consulted


current;y on aspirin and Plavix and statin


permissive htn


speech therapy


pt/ot evaluations





2.  History of sleep apnea.  Continue CPAP.





3.  History for hypertension.  Holding lisinopril for permissive HTN. continue 

atenolol.Hydralazine prn.





4.  Hypothyroidism.  Continue Synthroid.





5.  History of gastroesophageal reflux disease, continue Zantac.





6.  History of diastolic congestive heart failure.  Continue her Lasix.  Will


monitor for any volume overload.





7. Prediabetes


hba1c 5.7. ADA diet





8.  Deep venous thrombosis prophylaxis, sequential compression devices for


now.


 


DISPOSITION


monitor in tele


Vital Signs:











  Date Time  Temp Pulse Resp B/P Pulse Ox O2 Delivery O2 Flow Rate FiO2


 


5/27/17 16:00      Room Air  


 


5/27/17 15:31 36.8 71 20 145/46 95 Room Air  





    164/89    


 


5/27/17 11:50      Room Air  


 


5/27/17 11:50  77 18 150/82 96 Room Air  


 


5/27/17 08:05 36.6 64 20 141/82 98 Room Air  


 


5/27/17 07:40      Room Air  


 


5/27/17 04:00 36.4 61 18 110/53 96 CPAP  


 


5/27/17 04:00      CPAP  


 


5/27/17 00:00      CPAP  


 


5/26/17 23:53 36.5 59 18 164/79 96 Room Air  


 


5/26/17 22:10  67   96   21


 


5/26/17 20:00      Room Air  


 


5/26/17 18:28 36.5 74 16 143/78 96 Room Air  








Lab Results:





Results Past 24 Hours








Test


  5/26/17


22:30 5/27/17


06:35 Range/Units


 


 


Troponin I < 0.015 < 0.015 0-0.045  ng/ml


 


White Blood Count  8.71 4.8-10.8  K/uL


 


Red Blood Count  4.48 4.2-5.4  M/uL


 


Hemoglobin  14.5 12.0-16.0  g/dL


 


Hematocrit  44.1 37-47  %


 


Mean Corpuscular Volume  98.4   fL


 


Mean Corpuscular Hemoglobin  32.4 25-34  pg


 


Mean Corpuscular Hemoglobin


Concent 


  32.9


  32-36  g/dl


 


 


Platelet Count  250 130-400  K/uL


 


Mean Platelet Volume  10.4 7.4-10.4  fL


 


Neutrophils (%) (Auto)  49.9  %


 


Lymphocytes (%) (Auto)  36.9  %


 


Monocytes (%) (Auto)  9.9  %


 


Eosinophils (%) (Auto)  2.2  %


 


Basophils (%) (Auto)  0.8  %


 


Neutrophils # (Auto)  4.35 1.4-6.5  K/uL


 


Lymphocytes # (Auto)  3.21 1.2-3.4  K/uL


 


Monocytes # (Auto)  0.86 0.11-0.59  K/uL


 


Eosinophils # (Auto)  0.19 0-0.5  K/uL


 


Basophils # (Auto)  0.07 0-0.2  K/uL


 


RDW Standard Deviation  49.2 36.4-46.3  fL


 


RDW Coefficient of Variation  13.8 11.5-14.5  %


 


Immature Granulocyte % (Auto)  0.3  %


 


Immature Granulocyte # (Auto)  0.03 0.00-0.02  K/uL


 


Sodium Level  144 136-145  mmol/L


 


Potassium Level  4.3 3.5-5.1  mmol/L


 


Chloride Level  108   mmol/L


 


Carbon Dioxide Level  29 21-32  mmol/L


 


Anion Gap  7.0 3-11  mmol/L


 


Blood Urea Nitrogen  23 7-18  mg/dl


 


Creatinine


  


  0.69


  0.60-1.20


mg/dl


 


Est Creatinine Clear Calc


Drug Dose 


  95.9


   ml/min


 


 


Estimated GFR (


American) 


  102.2


   


 


 


Estimated GFR (Non-


American 


  88.2


   


 


 


BUN/Creatinine Ratio  33.0 10-20  


 


Random Glucose  104 70-99  mg/dl


 


Estimated Average Glucose  117  mg/dl


 


Hemoglobin A1c  5.7 4.5-5.6  %


 


Calcium Level  8.6 8.5-10.1  mg/dl


 


Triglycerides Level  149 0-150  mg/dl


 


Cholesterol Level  157 0-200  mg/dl


 


HDL Cholesterol  43  mg/dl


 


LDL Cholesterol, Calculated  84  mg/dl


 


VLDL Cholesterol, Calculated  30  mg/dl


 


Cholesterol/HDL Ratio  3.7  


 


Chemistry Specimen Hemolysis

## 2017-05-28 VITALS
OXYGEN SATURATION: 96 % | SYSTOLIC BLOOD PRESSURE: 139 MMHG | HEART RATE: 61 BPM | DIASTOLIC BLOOD PRESSURE: 83 MMHG | TEMPERATURE: 98.06 F

## 2017-05-28 VITALS
DIASTOLIC BLOOD PRESSURE: 66 MMHG | OXYGEN SATURATION: 97 % | HEART RATE: 67 BPM | TEMPERATURE: 98.78 F | SYSTOLIC BLOOD PRESSURE: 136 MMHG

## 2017-05-28 VITALS
OXYGEN SATURATION: 99 % | SYSTOLIC BLOOD PRESSURE: 159 MMHG | DIASTOLIC BLOOD PRESSURE: 73 MMHG | TEMPERATURE: 97.52 F | HEART RATE: 60 BPM

## 2017-05-28 VITALS
HEART RATE: 67 BPM | TEMPERATURE: 97.88 F | SYSTOLIC BLOOD PRESSURE: 138 MMHG | OXYGEN SATURATION: 95 % | DIASTOLIC BLOOD PRESSURE: 70 MMHG

## 2017-05-28 VITALS
SYSTOLIC BLOOD PRESSURE: 161 MMHG | HEART RATE: 73 BPM | DIASTOLIC BLOOD PRESSURE: 70 MMHG | OXYGEN SATURATION: 95 % | TEMPERATURE: 98.6 F

## 2017-05-28 VITALS — HEART RATE: 76 BPM | DIASTOLIC BLOOD PRESSURE: 81 MMHG | OXYGEN SATURATION: 97 % | SYSTOLIC BLOOD PRESSURE: 164 MMHG

## 2017-05-28 LAB
ANION GAP SERPL CALC-SCNC: 6 MMOL/L (ref 3–11)
BASOPHILS # BLD: 0.06 K/UL (ref 0–0.2)
BASOPHILS NFR BLD: 0.7 %
BUN SERPL-MCNC: 19 MG/DL (ref 7–18)
BUN/CREAT SERPL: 26.2 (ref 10–20)
CALCIUM SERPL-MCNC: 8.5 MG/DL (ref 8.5–10.1)
CHLORIDE SERPL-SCNC: 109 MMOL/L (ref 98–107)
CO2 SERPL-SCNC: 29 MMOL/L (ref 21–32)
COMPLETE: YES
CREAT CL PREDICTED SERPL C-G-VRATE: 93.6 ML/MIN
CREAT SERPL-MCNC: 0.72 MG/DL (ref 0.6–1.2)
EOSINOPHIL NFR BLD AUTO: 222 K/UL (ref 130–400)
GLUCOSE SERPL-MCNC: 109 MG/DL (ref 70–99)
HCT VFR BLD CALC: 43.6 % (ref 37–47)
IG%: 0.1 %
IMM GRANULOCYTES NFR BLD AUTO: 37.3 %
LYMPHOCYTES # BLD: 3.32 K/UL (ref 1.2–3.4)
MCH RBC QN AUTO: 32.8 PG (ref 25–34)
MCHC RBC AUTO-ENTMCNC: 33 G/DL (ref 32–36)
MCV RBC AUTO: 99.3 FL (ref 80–100)
MONOCYTES NFR BLD: 10.6 %
NEUTROPHILS # BLD AUTO: 2.6 %
NEUTROPHILS NFR BLD AUTO: 48.7 %
PMV BLD AUTO: 10.8 FL (ref 7.4–10.4)
POTASSIUM SERPL-SCNC: (no result) MMOL/L (ref 3.5–5.1)
RBC # BLD AUTO: 4.39 M/UL (ref 4.2–5.4)
SODIUM SERPL-SCNC: 144 MMOL/L (ref 136–145)
WBC # BLD AUTO: 8.9 K/UL (ref 4.8–10.8)

## 2017-05-28 RX ADMIN — Medication SCH GM: at 08:00

## 2017-05-28 RX ADMIN — ATORVASTATIN CALCIUM SCH MG: 40 TABLET, FILM COATED ORAL at 07:56

## 2017-05-28 RX ADMIN — CLOPIDOGREL BISULFATE SCH MG: 75 TABLET, FILM COATED ORAL at 07:56

## 2017-05-28 RX ADMIN — MELOXICAM SCH MG: 7.5 TABLET ORAL at 20:00

## 2017-05-28 RX ADMIN — OXYCODONE HYDROCHLORIDE AND ACETAMINOPHEN SCH MG: 500 TABLET ORAL at 19:59

## 2017-05-28 RX ADMIN — RANITIDINE SCH MG: 150 TABLET ORAL at 20:01

## 2017-05-28 RX ADMIN — FLUTICASONE PROPIONATE SCH SPRAYS: 50 SPRAY, METERED NASAL at 07:56

## 2017-05-28 RX ADMIN — LEVOTHYROXINE SODIUM SCH MCG: 175 TABLET ORAL at 05:03

## 2017-05-28 RX ADMIN — POTASSIUM CHLORIDE SCH MEQ: 750 TABLET, EXTENDED RELEASE ORAL at 07:57

## 2017-05-28 RX ADMIN — Medication SCH GM: at 07:56

## 2017-05-28 RX ADMIN — Medication SCH MG: at 07:56

## 2017-05-28 NOTE — ECHOCARDIOGRAM REPORT
*NOTICE TO RECEIVING PARTY AGENCY**  This information is strictly Confidential and protected under 
[place] law.  [place] law prohibits you from making any further disclosure of this 
information unless further disclosure is expressly permitted by the written consent of the person to 
whom it pertains or is authorized by law.  A general authorization for the release of medical or 
other information is not sufficient for this purpose.  Hospital accepts no responsibility if the 
information is made available to any other person, INCLUDING THE PATIENT.



Interpretation Summary

  *  Name: ED MILNER  Study Date: 2017 09:34 AM  BP: 110/53 mmHg

  *  MRN: [id number]  Patient Location: C.2T\S\S229\S\2  HR: 68

  *  : 1945 (M/d/yyyy)  Gender: Female  Height: 64 in

  *  Age: 72 yrs  Ethnicity: [place]  Weight: 269 lb

  *  Ordering Physician: Isai Alvarado

  *  Referring Physician: Self, Referred

  *  Performed By: Saúl Abdalla RDCS

  *  Accession# [id number]  Account# [id number]

  *  Reason For Study: CVA

  *  BSA: 2.2 m2

  *  -- Conclusions --

  *  The left ventricle is normal in size.

  *  There is borderline concentric left ventricular hypertrophy.

  *  The left ventricular wall motion is normal.

  *  Ejection Fraction = 60-65%.

  *  Aortic valve sclerosis mild, without significant aortic valvular stenosis.

  *  There is mild to moderate mitral regurgitation.

  *  There is mild tricuspid regurgitation.

  *  Right ventricular systolic pressure is elevated at 30-40mmHg.

  *  The aortic root is normal size.

  *  Mildly dilated ascending aorta 4.3 cm

  *  The interatrial septum is intact with no evidence for an atrial septal defect.

  *  Injection of contrast documented no interatrial shunt.

Procedure Details

  *  A complete two-dimensional transthoracic echocardiogram was performed (2D, M-mode, Doppler and 
color flow Doppler).

  *  The study was technically difficult.

  *  There were technical limitations due to patient'sbody habitus

  *  The study was technically difficult, but visualization was adequate with the administration of 
Definity ultrasound contrast.

  *  A contrast injection of Definity was performed to improve assessment of LV function.

  *  Contrast was injected into an intravenous site in the right arm.

  *  One vial of Definity ultrasound contrast was diluted in normal saline to a total volume of 10 
ml.  A total of '5' ml of solution was administered during imaging.

  *  Lot # [id number] of Definity utilized for procedure.

  *  Expiration date .

  *  The attending nurse who injected the contrast agent was STAN Mcgee.

Left Ventricle

  *  The left ventricle is normal in size.

  *  There is borderline concentric left ventricular hypertrophy.

  *  Left ventricular systolic function is normal.

  *  Ejection Fraction = 60-65%.

  *  The left ventricular wall motion is normal.

Right Ventricle

  *  The right ventricle is normal in size and function.

Atria

  *  The left atrium is mildly dilated.

  *  Right atrial size is normal.

  *  Injection of contrast documented no interatrial shunt.

  *  The interatrial septum is intact with no evidence for an atrial septal defect.

Mitral Valve

  *  The mitral valve leaflets are mildly thickened.

  *  There is mild mitral annular calcification.

  *  There is no mitral valve stenosis.

  *  There is mild to moderate mitral regurgitation.

Tricuspid Valve

  *  The tricuspid valve is not well visualized, but is grossly normal.

  *  There is no tricuspid stenosis.

  *  There is mild tricuspid regurgitation.

  *  Right ventricular systolic pressure is elevated at 30-40mmHg.

Aortic Valve

  *  The aortic valve is trileaflet.

  *  Aortic valve sclerosis mild, without significant aortic valvular stenosis.

  *  No hemodynamically significant valvular aortic stenosis.

  *  Trace aortic regurgitation.

Pulmonic Valve

  *  The pulmonic valve is not well visualized.

Great Vessels

  *  The aortic root is normal size.

  *  Mildly dilated ascending aorta.

Pericardium/Pleural

  *  There is no pericardial effusion.

Great Vessels

  *  Normal inferior vena cava diameter and respiratory variation suggests normal central venous 
pressure.



MMode 2D Measurements and Calculations

IVSd 1.2 cm

IVSs 1.8 cm



LVIDd 5.0 cm

LVIDs 3.1 cm

LVPWd 1.2 cm

LVPWs 1.7 cm



IVS/LVPW 1.0 

FS 38.1 %

EDV(Teich) 115.9 ml

ESV(Teich) 37.0 ml

EF(Teich) 68.1 %



EDV(cubed) 121.8 ml

ESV(cubed) 28.9 ml

EF(cubed) 76.3 %

% IVS thick 43.4 %

% LVPW thick 46.7 %





LV mass(C)d 231.3 grams

LV mass(C)dI 104.3 grams/m\S\2

LV mass(C)s 211.2 grams

LV mass(C)sI 95.2 grams/m\S\2



SV(Teich) 78.9 ml

SI(Teich) 35.6 ml/m\S\2

SV(cubed) 92.9 ml

SI(cubed) 41.9 ml/m\S\2



EPSS 0.82 cm



Ao root diam 3.1 cm

Ao root area 7.5 cm\S\2

ACS 1.9 cm

LA dimension 4.4 cm





asc Aorta Diam 4.3 cm



LA/Ao 1.4 

LVOT diam 2.1 cm

LVOT area 3.4 cm\S\2



LVAd ap4 29.1 cm\S\2

LVLd ap4 7.7 cm

EDV(MOD-sp4) 89.0 ml

LVAs ap4 13.7 cm\S\2

LVLs ap4 5.9 cm

ESV(MOD-sp4) 26.0 ml

EF(MOD-sp4) 70.8 %



LVAd ap2 23.1 cm\S\2

LVLd ap2 7.1 cm

EDV(MOD-sp2) 62.0 ml

LVAs ap2 10.5 cm\S\2

LVLs ap2 5.7 cm

ESV(MOD-sp2) 18.0 ml

EF(MOD-sp2) 71.0 %





SV(MOD-sp4) 63.0 ml

SI(MOD-sp4) 28.4 ml/m\S\2



SV(MOD-sp2) 44.0 ml

SI(MOD-sp2) 19.8 ml/m\S\2











Doppler Measurements and Calculations

MV E max jesus 101.2 cm/sec

MV A max jesus 86.9 cm/sec



MV E/A 1.2 



MV dec time 0.20 sec



Ao V2 max 145.4 cm/sec

Ao max PG 8.5 mmHg

Ao max PG (full) 5.8 mmHg

STELLA(V,A) 1.9 cm\S\2

STELLA(V,D) 1.9 cm\S\2





LV V1 max PG 2.7 mmHg



LV V1 max 82.3 cm/sec



PA V2 max 88.4 cm/sec

PA max PG 3.1 mmHg



TR max jesus 298.6 cm/sec

## 2017-05-28 NOTE — PROGRESS NOTE
DATE: 05/28/2017

 

Ursula is better today.  Her speech is less dysarthric.  She can repeat in

complex phrases albeit in a slow fashion, but does not have any word finding

issues, paraphasia  neologisms, etc.  There may be a very slight drift of the

right arm and the right upper motor neuron facial paresis has significantly

improved.

 

I discussed the case with Dr. Fields who will be seeing her in cardiology

shortly.  He reviewed the echo, which was not available on the chart and the

issue with her thoracic aorta is actually aortic root dilatation not a true

aneurysm.  So the odds of this being a source of emboli to the brain are low.

 It is possible that she is going to need DENIS anyway just to clarify some

valvular abnormalities and to fully evaluate her for a PFO and get a better

view of the aorta, but I will let this decision up to Dr. Fields. I

doubt that this is a paradoxical embolism but she has been pretty sedentary and

is at risk for DVT, simply on this basis.  I am going to do ultrasounds of

the arm and leg just to be certain there is no occult source of clot as if

they were present, it might be a potential source of emboli through a patent

foramen ovale and if present might need to be treated with different 
anticoagulation.Cardiology will also hopefully address the need for outpatient 
Cardionet or Zio patch monitoring for potential aproysmal atrial fibrillation 
as a couse for the embolic events.

 

In terms of her stroke; however, she has improved significantly.  I do not

think she is going to really need any rehabilitation efforts and she could

probably do with outpatient speech therapy.  We will see how things go over

the next day, but I anticipate that we could even do the echocardiogram on an

outpatient basis if she is stable and so we will continue the aspirin and

Plavix for now.

 

 

 

 

ALEXANDRO

## 2017-05-28 NOTE — PROGRESS NOTE
Internal Med Progress Note


Date of Service:


May 28, 2017.


Provider Documentation:





SUBJECTIVE:





speech is much improved and almost back to baseline


still has some weakness in right upper extremity


tolerating soft diet fine


afebrile


no pain


ambulated fine














OBJECTIVE:





Vital Signs-as noted below





Exam:


General-alert and awake and oriented x 3. 


ENT-Normal hearing


Neck-no neck masses


Lungs-cta b/l no wheezing or crackles


Heart-s1 and s2 heard regular rate and rhythm no murmurs


Abdomen-soft bowel sounds present non tender no distension 


Extremities- no present no erythema


Neuro-alert and awake oriented 


mild right facial droop


mild right upper extremity pronator drift


moves extremities


Lab data as noted below.


ASSESSMENT & PLAN:


This 72-year-old female who presents with


cerebrovascular accident.


1.  Acute cerebrovascular accident involving right extremities and right facia 

droop and


garbled speech.  TPA not given because of thoracic aortic aneurysm and symptoms 

improving.


speech much improved


mild weakness in right upper extremity.


MRI head shows acute infarct in left frontoparietal region and left occipital 

region


mostly embolic stroke


Neurology recommends DENIS and evaluation of thoracic aortic aneurysm


cardiology consulted


currently on aspirin and Plavix and statin


permissive htn


seen by speech and tolerating soft diet


pt/ot evaluations


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


will f/u extremity ultrasound to rule out any dvt.


ambulating fine


plan for outpatient speech therapy


monitor in tele for now





2.  History of sleep apnea.  Continue CPAP.





3.  History for hypertension.  Holding lisinopril for permissive HTN. continue 

atenolol.Hydralazine prn.


will monitor





4.  Hypothyroidism.  Continue Synthroid.





5.  History of gastroesophageal reflux disease, continue Zantac.





6.  History of diastolic congestive heart failure.  Continue her Lasix.  Will


monitor for any volume overload.





7. Prediabetes


hba1c 5.7. ADA diet





8.  Deep venous thrombosis prophylaxis, sequential compression devices for


now.


 


DISPOSITION


monitor in tele


pt/ot


possible d/c in 1-2 days


Vital Signs:











  Date Time  Temp Pulse Resp B/P Pulse Ox O2 Delivery O2 Flow Rate FiO2


 


5/28/17 16:00      Room Air  


 


5/28/17 15:49 37.0 73 22 161/70 95 Room Air  


 


5/28/17 12:08 36.6 67 18 138/70 95 Room Air  


 


5/28/17 12:00      Room Air  


 


5/28/17 10:38  76   97   


 


5/28/17 08:00      Room Air  


 


5/28/17 07:56 36.7 61 20 139/83 96 Room Air  


 


5/28/17 04:00      CPAP  


 


5/28/17 03:09 36.4 60 20 159/73 99 BiPAP  


 


5/28/17 00:00      CPAP  


 


5/27/17 23:00 36.5 57 20 144/73 95 BiPAP  


 


5/27/17 20:00      Room Air  


 


5/27/17 19:58 36.5 67 18 164/87 95 Room Air  








Lab Results:





Results Past 24 Hours








Test


  5/28/17


05:28 5/28/17


07:07 Range/Units


 


 


White Blood Count 8.90  4.8-10.8  K/uL


 


Red Blood Count 4.39  4.2-5.4  M/uL


 


Hemoglobin 14.4  12.0-16.0  g/dL


 


Hematocrit 43.6  37-47  %


 


Mean Corpuscular Volume 99.3    fL


 


Mean Corpuscular Hemoglobin 32.8  25-34  pg


 


Mean Corpuscular Hemoglobin


Concent 33.0


  


  32-36  g/dl


 


 


Platelet Count 222  130-400  K/uL


 


Mean Platelet Volume 10.8  7.4-10.4  fL


 


Neutrophils (%) (Auto) 48.7   %


 


Lymphocytes (%) (Auto) 37.3   %


 


Monocytes (%) (Auto) 10.6   %


 


Eosinophils (%) (Auto) 2.6   %


 


Basophils (%) (Auto) 0.7   %


 


Neutrophils # (Auto) 4.34  1.4-6.5  K/uL


 


Lymphocytes # (Auto) 3.32  1.2-3.4  K/uL


 


Monocytes # (Auto) 0.94  0.11-0.59  K/uL


 


Eosinophils # (Auto) 0.23  0-0.5  K/uL


 


Basophils # (Auto) 0.06  0-0.2  K/uL


 


RDW Standard Deviation 49.4  36.4-46.3  fL


 


RDW Coefficient of Variation 13.7  11.5-14.5  %


 


Immature Granulocyte % (Auto) 0.1   %


 


Immature Granulocyte # (Auto) 0.01  0.00-0.02  K/uL


 


Sodium Level 144  136-145  mmol/L


 


Potassium Level  4.0 3.5-5.1  mmol/L


 


Chloride Level 109    mmol/L


 


Carbon Dioxide Level 29  21-32  mmol/L


 


Anion Gap 6.0  3-11  mmol/L


 


Blood Urea Nitrogen 19  7-18  mg/dl


 


Creatinine


  0.72


  


  0.60-1.20


mg/dl


 


Est Creatinine Clear Calc


Drug Dose 93.6


  


   ml/min


 


 


Estimated GFR (


American) 98.3


  


   


 


 


Estimated GFR (Non-


American 84.9


  


   


 


 


BUN/Creatinine Ratio 26.2  10-20  


 


Random Glucose 109  70-99  mg/dl


 


Calcium Level 8.5  8.5-10.1  mg/dl

## 2017-05-29 VITALS
TEMPERATURE: 98.06 F | DIASTOLIC BLOOD PRESSURE: 77 MMHG | HEART RATE: 72 BPM | OXYGEN SATURATION: 97 % | SYSTOLIC BLOOD PRESSURE: 127 MMHG

## 2017-05-29 VITALS
HEART RATE: 64 BPM | DIASTOLIC BLOOD PRESSURE: 81 MMHG | SYSTOLIC BLOOD PRESSURE: 147 MMHG | OXYGEN SATURATION: 97 % | TEMPERATURE: 98.6 F

## 2017-05-29 VITALS
OXYGEN SATURATION: 94 % | DIASTOLIC BLOOD PRESSURE: 89 MMHG | SYSTOLIC BLOOD PRESSURE: 139 MMHG | HEART RATE: 65 BPM | TEMPERATURE: 98.42 F

## 2017-05-29 VITALS
DIASTOLIC BLOOD PRESSURE: 89 MMHG | SYSTOLIC BLOOD PRESSURE: 148 MMHG | HEART RATE: 57 BPM | OXYGEN SATURATION: 97 % | TEMPERATURE: 97.52 F

## 2017-05-29 VITALS
SYSTOLIC BLOOD PRESSURE: 151 MMHG | DIASTOLIC BLOOD PRESSURE: 68 MMHG | HEART RATE: 69 BPM | TEMPERATURE: 98.6 F | OXYGEN SATURATION: 98 %

## 2017-05-29 VITALS
DIASTOLIC BLOOD PRESSURE: 79 MMHG | SYSTOLIC BLOOD PRESSURE: 156 MMHG | TEMPERATURE: 98.06 F | HEART RATE: 68 BPM | OXYGEN SATURATION: 94 %

## 2017-05-29 VITALS
OXYGEN SATURATION: 97 % | HEART RATE: 77 BPM | DIASTOLIC BLOOD PRESSURE: 83 MMHG | TEMPERATURE: 98.06 F | SYSTOLIC BLOOD PRESSURE: 150 MMHG

## 2017-05-29 VITALS — OXYGEN SATURATION: 97 %

## 2017-05-29 LAB
ANION GAP SERPL CALC-SCNC: 7 MMOL/L (ref 3–11)
BASOPHILS # BLD: 0.09 K/UL (ref 0–0.2)
BASOPHILS NFR BLD: 0.9 %
BUN SERPL-MCNC: 14 MG/DL (ref 7–18)
BUN/CREAT SERPL: 19.1 (ref 10–20)
CALCIUM SERPL-MCNC: 8.7 MG/DL (ref 8.5–10.1)
CHLORIDE SERPL-SCNC: 108 MMOL/L (ref 98–107)
CO2 SERPL-SCNC: 27 MMOL/L (ref 21–32)
COMPLETE: YES
CREAT CL PREDICTED SERPL C-G-VRATE: 89.2 ML/MIN
CREAT SERPL-MCNC: 0.75 MG/DL (ref 0.6–1.2)
EOSINOPHIL NFR BLD AUTO: 230 K/UL (ref 130–400)
GLUCOSE SERPL-MCNC: 117 MG/DL (ref 70–99)
HCT VFR BLD CALC: 44.2 % (ref 37–47)
IG%: 0.1 %
IMM GRANULOCYTES NFR BLD AUTO: 30.5 %
LYMPHOCYTES # BLD: 3 K/UL (ref 1.2–3.4)
MCH RBC QN AUTO: 32.1 PG (ref 25–34)
MCHC RBC AUTO-ENTMCNC: 33.3 G/DL (ref 32–36)
MCV RBC AUTO: 96.5 FL (ref 80–100)
MONOCYTES NFR BLD: 9 %
NEUTROPHILS # BLD AUTO: 2.8 %
NEUTROPHILS NFR BLD AUTO: 56.7 %
PMV BLD AUTO: 10.4 FL (ref 7.4–10.4)
POTASSIUM SERPL-SCNC: 3.9 MMOL/L (ref 3.5–5.1)
RBC # BLD AUTO: 4.58 M/UL (ref 4.2–5.4)
SODIUM SERPL-SCNC: 142 MMOL/L (ref 136–145)
WBC # BLD AUTO: 9.84 K/UL (ref 4.8–10.8)

## 2017-05-29 RX ADMIN — FLUTICASONE PROPIONATE SCH SPRAYS: 50 SPRAY, METERED NASAL at 09:00

## 2017-05-29 RX ADMIN — Medication SCH GM: at 09:09

## 2017-05-29 RX ADMIN — ATORVASTATIN CALCIUM SCH MG: 40 TABLET, FILM COATED ORAL at 09:08

## 2017-05-29 RX ADMIN — LEVOTHYROXINE SODIUM SCH MCG: 175 TABLET ORAL at 05:54

## 2017-05-29 RX ADMIN — Medication SCH MG: at 09:08

## 2017-05-29 RX ADMIN — MELOXICAM SCH MG: 7.5 TABLET ORAL at 19:34

## 2017-05-29 RX ADMIN — OXYCODONE HYDROCHLORIDE AND ACETAMINOPHEN SCH MG: 500 TABLET ORAL at 19:34

## 2017-05-29 RX ADMIN — CLOPIDOGREL BISULFATE SCH MG: 75 TABLET, FILM COATED ORAL at 09:08

## 2017-05-29 RX ADMIN — POTASSIUM CHLORIDE SCH MEQ: 750 TABLET, EXTENDED RELEASE ORAL at 09:08

## 2017-05-29 RX ADMIN — RANITIDINE SCH MG: 150 TABLET ORAL at 19:33

## 2017-05-29 NOTE — DIAGNOSTIC IMAGING REPORT
Bilateral UPPER EXTREMITY VENOUS DOPPLER



HISTORY:      r/o dvt



COMPARISON STUDY:  None.



FINDINGS: The internal jugular veins are patent. There is normal flow within the

subclavian veins. There is normal flow and compressibility within the axillary,

basilic, brachial, radial, left ulnar, and visualized cephalic veins. The right

ulnar vein was not visualized due to the patient's indwelling IV.



IMPRESSION:  

No DVT within the visualized bilateral upper extremities. 







Electronically signed by:  Michael Arguello M.D.

5/29/2017 7:07 AM



Dictated Date/Time:  5/29/2017 7:05 AM

## 2017-05-29 NOTE — PROGRESS NOTE
Internal Med Progress Note


Date of Service:


May 29, 2017.


Provider Documentation:





SUBJECTIVE:





sitting on the chair comfortably


comfortable


ambulating ok


eating ok


afebrile


ok to go home in am











OBJECTIVE:





Vital Signs-as noted below





Exam:


General-alert and awake and oriented x 3. 


ENT-Normal hearing


Neck-no neck masses


Lungs-cta b/l no wheezing or crackles


Heart-s1 and s2 heard regular rate and rhythm no murmurs


Abdomen-soft bowel sounds present non tender no distension 


Extremities- no present no erythema


Neuro-alert and awake oriented 


mild right facial droop


mild right upper extremity weakness


moves extremities


Lab data as noted below.


ASSESSMENT & PLAN:


This 72-year-old female who presents with


cerebrovascular accident.


1.  Acute cerebrovascular accident involving right extremities and right facia 

droop and


garbled speech.  TPA not given because of thoracic aortic aneurysm and symptoms 

improving.


speech much improved


mild weakness in right upper extremity.


MRI head shows acute infarct in left frontoparietal region and left occipital 

region


mostly embolic stroke


Neurology recommends DENIS and evaluation of thoracic aortic aneurysm


cardiology consulted


currently on aspirin and Plavix and statin


permissive htn


seen by speech and tolerating soft diet


pt/ot evaluations


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


No Dvt on extremity ultrasounds


ambulating fine


plan for outpatient speech therapy


monitor in tele for now


CardioNet as outpatient


pan for d/c in am





2.  History of sleep apnea.  Continue CPAP.





3.  History for hypertension.  Holding lisinopril for permissive HTN. continue 

atenolol.Hydralazine prn.


will monitor. Will restart lisinopril in am.





4.  Hypothyroidism.  Continue Synthroid.





5.  History of gastroesophageal reflux disease, continue Zantac.





6.  History of diastolic congestive heart failure.  Continue her Lasix.  Will


monitor for any volume overload.





7. Prediabetes


hba1c 5.7. ADA diet





8.  Deep venous thrombosis prophylaxis, sequential compression devices for


now.


 


DISPOSITION


monitor in tele


pt/ot


possible d/c in am


Vital Signs:











  Date Time  Temp Pulse Resp B/P Pulse Ox O2 Delivery O2 Flow Rate FiO2


 


5/29/17 12:00 36.7 72 18 127/77 97 Room Air  


 


5/29/17 12:00      Room Air  


 


5/29/17 08:00      Room Air  


 


5/29/17 07:33 36.9 65 16 139/89 94 Room Air  


 


5/29/17 04:16 37.0 69 16 151/68 98 BiPAP  


 


5/29/17 04:00      Room Air  


 


5/29/17 00:23 37.0 64 18 147/81 97 BiPAP  


 


5/29/17 00:00      Room Air  


 


5/28/17 20:00      Room Air  


 


5/28/17 19:26 37.1 67 22 136/66 97 Room Air  








Lab Results:





Results Past 24 Hours








Test


  5/29/17


06:34 Range/Units


 


 


White Blood Count 9.84 4.8-10.8  K/uL


 


Red Blood Count 4.58 4.2-5.4  M/uL


 


Hemoglobin 14.7 12.0-16.0  g/dL


 


Hematocrit 44.2 37-47  %


 


Mean Corpuscular Volume 96.5   fL


 


Mean Corpuscular Hemoglobin 32.1 25-34  pg


 


Mean Corpuscular Hemoglobin


Concent 33.3


  32-36  g/dl


 


 


Platelet Count 230 130-400  K/uL


 


Mean Platelet Volume 10.4 7.4-10.4  fL


 


Neutrophils (%) (Auto) 56.7  %


 


Lymphocytes (%) (Auto) 30.5  %


 


Monocytes (%) (Auto) 9.0  %


 


Eosinophils (%) (Auto) 2.8  %


 


Basophils (%) (Auto) 0.9  %


 


Neutrophils # (Auto) 5.57 1.4-6.5  K/uL


 


Lymphocytes # (Auto) 3.00 1.2-3.4  K/uL


 


Monocytes # (Auto) 0.89 0.11-0.59  K/uL


 


Eosinophils # (Auto) 0.28 0-0.5  K/uL


 


Basophils # (Auto) 0.09 0-0.2  K/uL


 


RDW Standard Deviation 46.4 36.4-46.3  fL


 


RDW Coefficient of Variation 13.3 11.5-14.5  %


 


Immature Granulocyte % (Auto) 0.1  %


 


Immature Granulocyte # (Auto) 0.01 0.00-0.02  K/uL


 


Sodium Level 142 136-145  mmol/L


 


Potassium Level 3.9 3.5-5.1  mmol/L


 


Chloride Level 108   mmol/L


 


Carbon Dioxide Level 27 21-32  mmol/L


 


Anion Gap 7.0 3-11  mmol/L


 


Blood Urea Nitrogen 14 7-18  mg/dl


 


Creatinine


  0.75


  0.60-1.20


mg/dl


 


Est Creatinine Clear Calc


Drug Dose 89.2


   ml/min


 


 


Estimated GFR (


American) 93.6


   


 


 


Estimated GFR (Non-


American 80.8


   


 


 


BUN/Creatinine Ratio 19.1 10-20  


 


Random Glucose 117 70-99  mg/dl


 


Calcium Level 8.7 8.5-10.1  mg/dl

## 2017-05-29 NOTE — DIAGNOSTIC IMAGING REPORT
BILATERAL LOWER EXTREMITY VENOUS DOPPLER



HISTORY:      Stroke.



COMPARISON STUDY:  None.



FINDINGS: There is normal compressibility, flow, and augmentation within the

bilateral lower extremity deep venous systems.



IMPRESSION:  

No DVT within the right or left lower extremity.







Electronically signed by:  Michael Arguello M.D.

5/29/2017 7:08 AM



Dictated Date/Time:  5/29/2017 7:07 AM

## 2017-05-29 NOTE — PROGRESS NOTE
DATE: 05/29/2017

 

Ursula looks great today.  She is moving around, walking without any issues. 

There may be a little clumsiness of the right arm at times.  The right facial

asymmetry is now more of an asymmetry than a paresis.  Her speech is a minor

terminal dysarthric quality to it.  Word finding, etc. is absolutely perfect

and there is no visual field cut.

 

Dr. Fields has assessed the echo.  We now have evidence of essentially normal

left ventricular function and a slightly dilated aortic root, which is not

aneurysmal and no evidence for a right to left shunt on a saline injection or

direct visualization of the interatrial septum.  There is no evidence for DVT

in the upper or lower extremity ultrasounds.

 

In my opinion, Ursula could probably go home on aspirin and Plavix and we will

have to do an outpatient ZIO Patch or CardioNet monitor as the remainder of

the differential diagnosis for causation consists of atrial fibrillation,

paroxysmal type as I think we have effectively eliminated all other sources

other than some possible shaggy plaque in the aorta, in which case the current 
treatment would be favored over Coumadin or novel anticoagulant, anyway

 

In summary, then neurology feels she probably could go home.  We could take a

look at her in about 3-4 weeks and in the interim, hopefully an outpatient

CardioNet monitor or ZIO Patch could be obtained.

 

I would not do anything other than aspirin and Plavix in this situation.  In

a 3 months' time, one of the two is going to probably have to be stopped as a

therapeutic advantage, a combination antiplatelet therapy beyond 3 months is

overshadowed by the risk of bleeding.

 

 

 

 

PATRICIAD

## 2017-05-30 VITALS
TEMPERATURE: 98.42 F | SYSTOLIC BLOOD PRESSURE: 139 MMHG | OXYGEN SATURATION: 93 % | DIASTOLIC BLOOD PRESSURE: 75 MMHG | HEART RATE: 65 BPM

## 2017-05-30 VITALS
OXYGEN SATURATION: 98 % | TEMPERATURE: 98.42 F | HEART RATE: 60 BPM | SYSTOLIC BLOOD PRESSURE: 139 MMHG | DIASTOLIC BLOOD PRESSURE: 84 MMHG

## 2017-05-30 VITALS
SYSTOLIC BLOOD PRESSURE: 167 MMHG | OXYGEN SATURATION: 96 % | TEMPERATURE: 97.34 F | DIASTOLIC BLOOD PRESSURE: 75 MMHG | HEART RATE: 57 BPM

## 2017-05-30 VITALS
DIASTOLIC BLOOD PRESSURE: 75 MMHG | TEMPERATURE: 98.42 F | OXYGEN SATURATION: 93 % | HEART RATE: 65 BPM | SYSTOLIC BLOOD PRESSURE: 139 MMHG

## 2017-05-30 VITALS — OXYGEN SATURATION: 96 %

## 2017-05-30 RX ADMIN — CLOPIDOGREL BISULFATE SCH MG: 75 TABLET, FILM COATED ORAL at 08:10

## 2017-05-30 RX ADMIN — FLUTICASONE PROPIONATE SCH SPRAYS: 50 SPRAY, METERED NASAL at 08:09

## 2017-05-30 RX ADMIN — Medication SCH GM: at 08:10

## 2017-05-30 RX ADMIN — LEVOTHYROXINE SODIUM SCH MCG: 175 TABLET ORAL at 05:23

## 2017-05-30 RX ADMIN — Medication SCH MG: at 08:10

## 2017-05-30 RX ADMIN — ATORVASTATIN CALCIUM SCH MG: 40 TABLET, FILM COATED ORAL at 08:10

## 2017-05-30 RX ADMIN — POTASSIUM CHLORIDE SCH MEQ: 750 TABLET, EXTENDED RELEASE ORAL at 08:09

## 2017-05-30 NOTE — PROGRESS NOTE
Internal Med Progress Note


Date of Service:


May 30, 2017.


Provider Documentation:





SUBJECTIVE:





denies any [id number]


no weakness


speech clear


afebrile


wants to go home











OBJECTIVE:





Vital Signs-as noted below





Exam:


General-alert and awake and oriented x 3. 


ENT-Normal hearing


Neck-no neck masses


Lungs-cta b/l no wheezing or crackles


Heart-s1 and s2 heard regular rate and rhythm no murmurs


Abdomen-soft bowel sounds present non tender no distension 


Extremities- no present no erythema


Neuro-alert and awake oriented 


mild right facial droop


no weakness


moves extremities


Lab data as noted below.


ASSESSMENT & PLAN:


This 72-year-old female who presents with


cerebrovascular accident.


1.  Acute cerebrovascular accident involving right extremities and right facia 

droop and


garbled speech.  TPA not given because of thoracic aortic aneurysm and symptoms 

improving.


speech much improved


mild weakness in right upper extremity.


MRI head shows acute infarct in left frontoparietal region and left occipital 

region


mostly embolic stroke


Neurology recommends DENIS and evaluation of thoracic aortic aneurysm


cardiology consulted


currently on aspirin and Plavix and statin


permissive htn


seen by speech and tolerating dental soft diet


pt/ot evaluations


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


No Dvt on extremity ultrasounds


ambulating fine


plan for outpatient speech therapy


CardioNet as outpatient


discharged to followup with pcp and neurology





2.  History of sleep apnea.  Continue CPAP.f/u with sleep doctor





3.  History for hypertension.  Holding lisinopril for permissive HTN. continue 

atenolol.Hydralazine prn.


will monitor. discharged on home meds. f/u with pcp.





4.  Hypothyroidism.  Continue Synthroid.





5.  History of gastroesophageal reflux disease, continue Zantac.





6.  History of diastolic congestive heart failure.  Continue her Lasix.  Will


monitor for any volume overload.





7. Prediabetes


hba1c 5.7. ADA diet. f/u with pcp





Discharged home


Vital Signs:











  Date Time  Temp Pulse Resp B/P Pulse Ox O2 Delivery O2 Flow Rate FiO2


 


5/30/17 13:32 36.9 65 20  93 Room Air  


 


5/30/17 12:09 36.9 65 20 139/75 93 Room Air  


 


5/30/17 12:00      Room Air  


 


5/30/17 08:26 36.9 60 16 139/84 98 Room Air  


 


5/30/17 08:00      Room Air  


 


5/30/17 04:00     96 CPAP  


 


5/30/17 03:28 36.3 57 21 167/75 96 CPAP  


 


5/29/17 23:59     97 CPAP  


 


5/29/17 22:56 36.4 57 20 148/89 97 CPAP  


 


5/29/17 20:00      Room Air  


 


5/29/17 19:02 36.7 68 17 156/79 94 Room Air  


 


5/29/17 16:00      Room Air  


 


5/29/17 16:00 36.7 77 18 150/83 97 Room Air

## 2017-05-30 NOTE — CARDIOLOGY CONSULTATION
DATE OF CONSULTATION:  05/28/2017

 

REFERRING PHYSICIAN:  Carlo Ga and Isai Alvarado MD

 

PRIMARY CARE PHYSICIAN:  Koby Royal DO

 

INDICATIONS:  Probable embolic stroke.

 

HISTORY OF PRESENT ILLNESS:  The patient is a 72-year-old female whose

history is notable for longstanding hypertension, hyperlipidemia on therapy,

degenerative joint disease, diastolic heart function with preserved systolic

function, history of past documented ascending aortic dilatation mild and

obstructive sleep apnea.  The patient is hospitalized now with acute onset

dysarthria and left-sided weakness, currently being managed conservatively. 

She is referred now for further evaluation from a cardiac standpoint.  She

denies any history of chest pain or angina.  Notes rare palpitation.  No

sustained tachyarrhythmias.  Notes no prior history of TIA, stroke, renal or

hepatic disease. She was recently diagnosed with infiltrating ductal

carcinoma and underwent breast biopsy with patient off antiplatelet therapy

for procedures.  She denies fevers, chills or productive cough.  Notes no

melena or hematochezia.  Her appetite and weight have been stable.

 

REVIEW OF SYSTEMS:  Otherwise negative.

 

ALLERGIES:  TO SULFA.

 

MEDICATIONS:  Prior to hospitalization were; lisinopril 40 mg p.o. daily,

levothyroxine 125 mcg per day, atenolol 50 mg p.o. daily, ranitidine 300 mg

at bedtime, Lasix 40 mg p.o. daily, potassium chloride 10 mg p.o. daily,

meloxicam p.r.n. pain, omega-3 fish oil 1 gram every day, Flonase daily,

aspirin 81 mg per day as noted with recent disruption for a surgical

procedure.

 

PAST SURGICAL HISTORY:  Notable for; bilateral total knee arthroplasty,

carpal tunnel surgery, EGD with biopsies, cholecystectomy, thyroidectomy,

abdominal exploratory surgery, THAD with BSO, omentectomy for uterine

carcinoma and umbilical hernia repair.

 

FAMILY HISTORY:  Positive for hypertension and mother with history of

thrombotic disease.

 

SOCIAL HISTORY:  The patient is a nonsmoker and nondrinker.

 

PHYSICAL EXAMINATION:

VITAL SIGNS:  Heart rate is 67 and blood pressure is 138/70.

HEENT:  Normocephalic and atraumatic.  Nares without discharge.  Throat was

clear.

NECK:  Supple without thyromegaly, lymphadenopathy, JVD or bruits.

LUNGS:  Clear to auscultation.

CARDIOVASCULAR:  Regular.  There is normal S1, S2.  There is no murmur,

gallop or rub.

ABDOMEN:  Soft and nontender.  There is no palpable hepatosplenomegaly.

EXTREMITIES:  Without cyanosis or clubbing.  There is no palpable cord or

Homans sign.  There is no edema.

 

DATA:  EKG reveals sinus rhythm with rate of 60, normal tracing.  Telemetry

reveals sinus rhythm with atrial ectopic beats only.  No arrhythmias. 

Echocardiogram performed this morning demonstrates borderline left

ventricular hypertrophy, normal left ventricular systolic function and

mild-to-moderate mitral and tricuspid insufficiency.  The interatrial septum

was intact by agitated saline contrast injection.  No signs of intracardiac

shunt.  The ascending aorta is mildly enlarged at 4.3 cm.  Carotid ultrasound

revealed no obstructive disease.  MRI per reports demonstrated evidence of

left middle cerebral artery distribution stroke.

 

IMPRESSION:  Complex 72-year-old female who presents now with left-sided

stroke, raising concerns regarding possible embolic source, aortic aneurysm

is only in term per nature with only mild dilation of the ascending aorta

would not likely represent culprit or cause.  There have been no signs of

arrhythmias on telemetry currently or by history.  Echocardiogram reveals no

overt intracardiac shunt or cardiac source of emboli.

 

RECOMMENDATIONS:  Would continue aspirin and clopidogrel as ordered. 

Continue antihypertensive therapy and intensify lipid reduction therapy as

ordered.  While in the hospital would continue telemetry and recommend

outpatient event monitor postdischarge to exclude atrial arrhythmias.  Treat

underlying obstructive sleep apnea as well.

## 2017-05-30 NOTE — DISCHARGE SUMMARY
Discharge Summary


Date of Service


May 30, 2017.





Discharge Summary


Admission Date:


May 26, 2017 at 16:36


Discharge Date:  May 30, 2017


Discharge Disposition:  Home with services


Principal Diagnosis:


ACUTE CVA


Secondary Diagnoses/Problems:


diastolic CHF, hypertension, history of gout,


hypothyroidism, GERD, aneurysm of ascending aorta, diverticular distal colon,


moderate obstructive sleep apnea, irritable bowel syndrome


Procedures:


HEAD CT:


There is no hemorrhage, mass effect, or evidence of acute


territorial ischemia by CT criteria.





BRAIN MRI:


1. 2.7 x 1.5 cm focus of restricted diffusion within left frontotemporal region


consistent with acute infarct. Several punctate foci of acute infarction within


the left occipital lobe. No mass effect or evidence of hemorrhagic conversion.





2. Mild diffuse smooth dural enhancement. This finding is entirely nonspecific


and of uncertain clinical significance. This could be seen in setting of


intracranial hypotension. However, the differential also includes neoplastic,


infectious and granulomatous processes.





3. Indeterminate 1.5 cm right parietal scalp lesion which could be correlated


with physical exam.





BRAIN MRA:


1. No abrupt vessel cut off. No aneurysm identified although sensitivity


diminished due to motion artifact.





2. Study mildly compromised by motion artifact.





NECK MRA:


No significant stenosis within the major vasculature of the neck.





CAROTID US:


 No evidence of a hemodynamically significant stenosis.





LOWE EXTREMITY DOPPLER:


No DVT within the right or left lower extremity.





UPPER EXTREMITY US:


No DVT within the visualized bilateral upper extremities.








ECHO:


* The left ventricle is normal in size.


* There is borderline concentric left ventricular hypertrophy.


* The left ventricular wall motion is normal.


* Ejection Fraction = 60-65%.


* Aortic valve sclerosis mild, without significant aortic valvular stenosis.


* There is mild to moderate mitral regurgitation.


* There is mild tricuspid regurgitation.


* Right ventricular systolic pressure is elevated at 30-40mmHg.


* The aortic root is normal size.


* Mildly dilated ascending aorta 4.3 cm


* The interatrial septum is intact with no evidence for an atrial septal defect.


* Injection of contrast documented no interatrial shunt.


Consultations:


NEUROLOGY


CARDIOLOGY





Medication Reconciliation


New Medications:  


Atorvastatin (Lipitor) 40 Mg Tab


40 MG PO DAILY, #30 TAB 2 Refills





Clopidogrel Bisulfate (Clopidogrel) 75 Mg Tab


75 MG PO QAM, #30 TAB 2 Refills





 


Continued Medications:  


Ascorbic Acid (Ascorbic Acid) 500 Mg Tab


500 MG PO HS, TAB





Aspirin (Aspirin EC Low Dose) 81 Mg Ectab


81 MG PO DAILY





Atenolol (Tenormin) 50 Mg Tab


50 MG PO HS





Fish Oil (Omega-3) 1 Ea Cap


1 CAP PO DAILY, CAP





Fluticasone Propionate (Nasal) (Flonase Allergy Relief Ch) 50 Mcg/Act Spr


2 SPRAYS INH DAILY





Furosemide (Lasix) 40 Mg Tab


40 MG PO DAILY, #30 TAB





Levothyroxine Sodium (Synthroid) 175 Mcg Tab


175 MCG PO QAM





Lisinopril (Lisinopril) 20 Mg Tab


40 MG PO DAILY





Meloxicam (Mobic) 15 Mg Tab


1 TAB PO HS for 30 Days, TAB 2 Refills





Potassium Chloride (Potassium Chloride Er) 10 Meq Cap


1 CAP PO DAILY for 30 Days, #30 CAP 1 Refill (This prescription has been renewed

)





Ranitidine (Zantac) 300 Mg Tab


300 MG PO HS, 0 Refills





Zolpidem Tartrate (Ambien) 5 Mg Tab


5 MG PO HS PRN for Insomnia, #20 TAB











Admission Information


HPI (per Admitting provider):


This is a 72-year-old female with past medical


history significant for diastolic CHF, hypertension, history of gout,


hypothyroidism, GERD, aneurysm of ascending aorta, diverticular distal colon,


moderate obstructive sleep apnea, irritable bowel syndrome, presents with


garbled speech, right-sided facial droop and right-sided weakness.  The


symptoms started around 12 o'clock and patient initially felt weak in both


extremities.  When the family talked to her on the phone, she was having


garbled speech and she was brought in here and stroke alert was called and


initial CAT scan of the head was unremarkable.  Initially, the symptoms of


weakness improved and speech seemed to be improved . [facility] Neurologist 

determined not to


give TPA because of her improving symptoms and because of her ascending


aortic aneurysm.  Currently after the patient got a dose of Ativan, 


patient's speech seems more garbled, but she is able to obey commands and


seems to understand the questions except for mild pronator drift in right


upper extremity, rest of the exams seems nonfocal.  Denies any chest pain, no


shortness of breath.  Denies any and nausea and denies any abdominal pain. 


Hemodynamically stable, afebrile.


Physical Exam (per Admitting):


GENERAL:  The patient is morbidly obese, currently having garbled speech.


VITAL SIGNS:  Temperature 37, pulse 73, respiratory rate 20, blood pressure


185/105 oxygen 98% on room air.


HEENT:  No pallor, no icterus.  Pupils equal, round, and reactive to light.


NECK:  No JVD, no neck masses, no carotid bruits.


CARDIOVASCULAR:  S1, S2 heard, regular rate and rhythm, no murmur, no gallop.


RESPIRATORY SYSTEM:  Normal AP diameter.  No accessory muscle use.  No


wheezing, no crackles.


ABDOMEN:  Soft, bowel sounds present.  Nontender.  No distention.


CENTRAL NERVOUS SYSTEM:  Speech is garbled.  Obeys commands strength 5/5


in the extremities.  Can elevate both the lower extremities.  Has mild


pronator drift on the right upper extremity.  Babinski negative.


EXTREMITIES:  Lower extremities, no edema, no erythema.





Hospital Course


This 72-year-old female who presents with


cerebrovascular accident.


1.  Acute cerebrovascular accident involving right extremities and right facia 

droop and


garbled speech.  TPA not given because of thoracic aortic aneurysm and symptoms 

improving.


speech much improved


mild weakness in right upper extremity.


MRI head shows acute infarct in left frontoparietal region and left occipital 

region


mostly embolic stroke


Neurology recommends DENIS and evaluation of thoracic aortic aneurysm


cardiology consulted


currently on aspirin and Plavix and statin


permissive htn


seen by speech and tolerating dental soft diet


pt/ot evaluations


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


No Dvt on extremity ultrasounds


ambulating fine


plan for outpatient speech therapy


[facility] as outpatient


discharged to followup with pcp and neurology





2.  History of sleep apnea.  Continue CPAP.f/u with sleep doctor





3.  History for hypertension.  Holding lisinopril for permissive HTN. continue 

atenolol.Hydralazine prn.


will monitor. discharged on home meds. f/u with pcp.





4.  Hypothyroidism.  Continue Synthroid.





5.  History of gastroesophageal reflux disease, continue Zantac.





6.  History of diastolic congestive heart failure.  Continue her Lasix.  Will


monitor for any volume overload.





7. Prediabetes


hba1c 5.7. ADA diet. f/u with pcp





Discharged home


Total time spent on discharge = 40MINUTES


This includes examination of the patient, discharge planning, medication 

reconciliation, and communication with other providers.





Discharge Instructions


Discharge Instructions


Date of Service


May 30, 2017.





Admission


Reason for Admission:  Cva (Cerebral Vascular Accident)





Discharge


Discharge Diagnosis / Problem:  CVA





Discharge Goals


Goal(s):  Decrease discomfort, Improve function





Activity Recommendations


Activity Limitations:  resume your previous activity





.





Instructions / Follow-Up


Instructions / Follow-Up


FOLLOWUP WITH FAMILY DOCTOR  ON JUNE 2ND AT 9:30AM.





FOLLOWUP WITH NEUROLOGY  IN 3-4 WEEKS.





TO CONTINUE ASPIRIN AND PLAVIX FOR THREE MONTHS AND THEN CAN STOP ASPIRIN AND 

CONTINUE PLAVIX INDEFINITELY.





CARDIONET AS OUTPATIENT WITH FAMILY DOCTOR TO CHECK FOR ARRHYTHMIA'S.





FOLLOWUP WITH SLEEP DOCTOR FOR ANY ADJUSTMENTS IN CPAP.





PRE DIABETES. TO BE ON DIABETIC DIET AND FOLLOWUP WITH FAMILY DOCTOR REGULARLY.





NEW MEDICATIONS:





PLAVIX 75MG DAILY.





ATORVASTATIN(LIPITOR) 40MG PO DAILY( FOR HYPERLIPIDEMIA)








Risk Factors for Stroke:





You can reduce your chances of stroke by working with your medical provider to 

adopt a healthy lifestyle.  Some specific ways to lower your chance of stroke 

are:





* If you are a smoker, now is the time to stop smoking cigarettes


* If you are diabetic, improve the control of your blood sugars


* Avoid excessive amounts of alcohol


* Control high blood pressure


* Lose weight if you are overweight


* Be sure to lead an active lifestyle


* Eat a healthy diet low in salt, cholesterol and fat





You should know about other risk factors for stroke that you are unable to 

control.  These include:





* Age 55 years or older


* Male gender


* Certain racial groups: ,  or /


* Family History of Stroke, Mini stroke or Heart Attack


* Sickle Cell Disease





Follow Up:





It is important for you to keep your follow up appointments with your medical 

provider.





Current Hospital Diet


Patient's current hospital diet: Regular Diet





Discharge Diet


Recommended Diet:  AHA Diet (Heart Healthy), Diabetes Type 2 Diet


Diet Texture:  Dental Soft (bite-sized)





Pending Studies


Studies pending at discharge:  no





Laboratory Results





 Hemoglobin A1c








Test


  5/27/17


06:35 Range/Units


 


 


Estimated Average Glucose 117   mg/dl


 


Hemoglobin A1c 5.7 H 4.5-5.6  %








 Lipid Panel








Test


  5/27/17


06:35 Range/Units


 


 


Triglycerides Level 149  0-150  mg/dl


 


Cholesterol Level 157  0-200  mg/dl


 


HDL Cholesterol 43   mg/dl


 


Cholesterol/HDL Ratio 3.7   


 


LDL Cholesterol, Calculated 84   mg/dl











Medical Emergencies








.


Who to Call and When:





Medical Emergencies:  Call 911 immediately if you experience any of the 

following warning signs and symptoms of Stroke:





* Sudden numbness or weakness of the face, arm or leg, especially on one side 

of the body


* Sudden confusion, trouble speaking or understanding


* Sudden trouble seeing in one or both eyes


* Sudden trouble walking, dizziness, loss of balance or coordination


* Sudden severe headache with no cause





Do not delay calling 911 if you experience any warning signs or symptoms of a 

stroke.





Delay in seeking medical attention may affect what treatments can be given to 

you.








.





Non-Emergent Contact


Non-Emergency issues call your:  Primary Care Provider





.


.





"Provider Documentation" section prepared by Isai Alvarado.








.





Stroke Core Measures


Reason no t-PA for Stroke:  Treatment not indicated


Reason no antithrom by day 2:  Treatment provided - N/A


Reason no antithrom at D/C:  Treatment provided - N/A


Reason no statin at D/C:  Treatment provided - N/A


Reason no anticoag w/a fib:  Treatment not indicated





VTE Core Measure


Inpt VTE Proph given/why not?:  SCD's

## 2017-05-30 NOTE — PHARMACY PROGRESS NOTE
Pharmacist Stroke Counseling


Date of Service


May 30, 2017.





Scope


Pharmacy has been consulted to provide medication discharge counseling for this 

patient admitted with ischemic stroke as per the Pharmacist Discharge 

Counseling for Stroke Patients Protocol.





Medications on Discharge


New Medications:  


Atorvastatin (Lipitor) 40 Mg Tab


40 MG PO DAILY, #30 TAB 2 Refills





Clopidogrel Bisulfate (Clopidogrel) 75 Mg Tab


75 MG PO QAM, #30 TAB 2 Refills





 


Continued Medications:  


Ascorbic Acid (Ascorbic Acid) 500 Mg Tab


500 MG PO HS, TAB





Aspirin (Aspirin EC Low Dose) 81 Mg Ectab


81 MG PO DAILY





Atenolol (Tenormin) 50 Mg Tab


50 MG PO HS





Fish Oil (Omega-3) 1 Ea Cap


1 CAP PO DAILY, CAP





Fluticasone Propionate (Nasal) (Flonase Allergy Relief Ch) 50 Mcg/Act Spr


2 SPRAYS INH DAILY





Furosemide (Lasix) 40 Mg Tab


40 MG PO DAILY, #30 TAB





Levothyroxine Sodium (Synthroid) 175 Mcg Tab


175 MCG PO QAM





Lisinopril (Lisinopril) 20 Mg Tab


40 MG PO DAILY





Meloxicam (Mobic) 15 Mg Tab


1 TAB PO HS for 30 Days, TAB 2 Refills





Potassium Chloride (Potassium Chloride Er) 10 Meq Cap


1 CAP PO DAILY for 90 Days, #90 CAP 1 Refill





Ranitidine (Zantac) 300 Mg Tab


300 MG PO HS, 0 Refills





Zolpidem Tartrate (Ambien) 5 Mg Tab


5 MG PO HS PRN for Insomnia, #20 TAB











Action


The above medications, specifically ones for stroke treatment/prophylaxis, have 

been reviewed in detail with the patient and family prior to discharge.  This 

includes indication, common adverse reactions, drug interactions, and 

medication administration.  Medication counseling has been employed using the 

teach-back method to ensure understanding.





Outcome


The patient and family have demonstrated understanding of the medications.





Please note, they are aware that the pharmacist will call them within 72 hours 

post-discharge to confirm that the appropriate medications are being taken and 

answer any further medication related questions the patient might have at that 

time.  





Contact information


Individual to be contacted: Ursula (patient).  Ursula provided verbal consent for 

permission to discuss with her family members if they answer the phone.


Phone number: [phone]. Alternate: [phone]


Best time to call: June 2nd afternoon (after visit with PCP scheduled for 0930)





Additional comments:


- Original plan was to discharge to rehab, but patient improved significantly 

and thus is being discharged home


- Reviewed her two new medications (Plavix, atorvastatin) in detail and also 

discussed aspirin.


- Minimal deficits noted post-CVA.  Patient willing and able to participate in 

care.


- Counseled patient, , plus two additional family members.


- Patient on Zantac for GERD, denied other medications.  Counseled on potential 

for pertinent drug interaction with Plavix if this is switched (specifically, 

omeprazole)





Thank you for allowing pharmacy to be involved in the care of this patient.  

Please call [phone] or [phone] with any additional questions

## 2017-05-30 NOTE — DISCHARGE INSTRUCTIONS
Discharge Instructions


Date of Service


May 30, 2017.





Admission


Reason for Admission:  Cva (Cerebral Vascular Accident)





Discharge


Discharge Diagnosis / Problem:  CVA





Discharge Goals


Goal(s):  Decrease discomfort, Improve function





Activity Recommendations


Activity Limitations:  resume your previous activity





.





Instructions / Follow-Up


Instructions / Follow-Up


FOLLOWUP WITH FAMILY DOCTOR  ON JUNE 2ND AT 9:30AM.





FOLLOWUP WITH NEUROLOGY  IN 3-4 WEEKS.





TO CONTINUE ASPIRIN AND PLAVIX FOR THREE MONTHS AND THEN CAN STOP ASPIRIN AND 

CONTINUE PLAVIX INDEFINITELY.





CARDIONET AS OUTPATIENT WITH FAMILY DOCTOR TO CHECK FOR ARRHYTHMIA'S.





FOLLOWUP WITH SLEEP DOCTOR FOR ANY ADJUSTMENTS IN CPAP.





PRE DIABETES. TO BE ON DIABETIC DIET AND FOLLOWUP WITH FAMILY DOCTOR REGULARLY.





NEW MEDICATIONS:





PLAVIX 75MG DAILY.





ATORVASTATIN(LIPITOR) 40MG PO DAILY( FOR HYPERLIPIDEMIA)








Risk Factors for Stroke:





You can reduce your chances of stroke by working with your medical provider to 

adopt a healthy lifestyle.  Some specific ways to lower your chance of stroke 

are:





* If you are a smoker, now is the time to stop smoking cigarettes


* If you are diabetic, improve the control of your blood sugars


* Avoid excessive amounts of alcohol


* Control high blood pressure


* Lose weight if you are overweight


* Be sure to lead an active lifestyle


* Eat a healthy diet low in salt, cholesterol and fat





You should know about other risk factors for stroke that you are unable to 

control.  These include:





* Age 55 years or older


* Male gender


* Certain racial groups: ,  or /


* Family History of Stroke, Mini stroke or Heart Attack


* Sickle Cell Disease





Follow Up:





It is important for you to keep your follow up appointments with your medical 

provider.





Current Hospital Diet


Patient's current hospital diet: Regular Diet





Discharge Diet


Recommended Diet:  AHA Diet (Heart Healthy), Diabetes Type 2 Diet


Diet Texture:  Dental Soft (bite-sized)





Pending Studies


Studies pending at discharge:  no





Laboratory Results





 Hemoglobin A1c








Test


  5/27/17


06:35 Range/Units


 


 


Estimated Average Glucose 117   mg/dl


 


Hemoglobin A1c 5.7 H 4.5-5.6  %








 Lipid Panel








Test


  5/27/17


06:35 Range/Units


 


 


Triglycerides Level 149  0-150  mg/dl


 


Cholesterol Level 157  0-200  mg/dl


 


HDL Cholesterol 43   mg/dl


 


Cholesterol/HDL Ratio 3.7   


 


LDL Cholesterol, Calculated 84   mg/dl











Medical Emergencies








.


Who to Call and When:





Medical Emergencies:  Call 911 immediately if you experience any of the 

following warning signs and symptoms of Stroke:





* Sudden numbness or weakness of the face, arm or leg, especially on one side 

of the body


* Sudden confusion, trouble speaking or understanding


* Sudden trouble seeing in one or both eyes


* Sudden trouble walking, dizziness, loss of balance or coordination


* Sudden severe headache with no cause





Do not delay calling 911 if you experience any warning signs or symptoms of a 

stroke.





Delay in seeking medical attention may affect what treatments can be given to 

you.








.





Non-Emergent Contact


Non-Emergency issues call your:  Primary Care Provider





.


.





"Provider Documentation" section prepared by Isai Alvarado.








.





Stroke Core Measures


Reason no t-PA for Stroke:  Treatment not indicated


Reason no antithrom by day 2:  Treatment provided - N/A


Reason no antithrom at D/C:  Treatment provided - N/A


Reason no statin at D/C:  Treatment provided - N/A


Reason no anticoag w/a fib:  Treatment not indicated





VTE Core Measure


Inpt VTE Proph given/why not?:  SCD's

## 2017-06-02 NOTE — PHARMACY PROGRESS NOTE
Pharmacist Post D/C Phone Note





Date of phone call:  Jun 2, 2017.








Individual with whom pharmacist spoke to:   Ursula West (patient) 








The following questions were reviewed during the phone call with responses 

listed below each:








Can you tell me the medications that you are currently taking as well as when 

and how you take each medication?











 Medications  Dose


 Route/Sig


 Max Daily Dose Days Date Category


 


 Potassium


 Chloride Er


  (Potassium


 Chloride) 10 Meq


 Cap  1 Cap


 PO DAILY


   30 5/30/17 Rx


 


 Lipitor


  (Atorvastatin


 Calcium) 40 Mg Tab  40 Mg


 PO DAILY


    5/30/17 Rx


 


 Clopidogrel


  (Clopidogrel


 Bisulfate) 75 Mg


 Tab  75 Mg


 PO QAM


    5/30/17 Rx


 


 Ambien (Zolpidem


 Tartrate) 5 Mg Tab  5 Mg


 PO HS PRN


    5/26/17 Rx


 


 Lasix


  (Furosemide) 40


 Mg Tab  40 Mg


 PO DAILY


    5/26/17 Rx


 


 Synthroid


  (Levothyroxine


 Sodium) 175 Mcg


 Tab  175 Mcg


 PO QAM


    5/26/17 Reported


 


 Aspirin EC Low


 Dose (Aspirin) 81


 Mg Ectab  81 Mg


 PO DAILY


    9/27/16 Reported


 


 Flonase Allergy


 Relief Ch


  (Fluticasone


 Propionate


  (Nasal)) 50


 Mcg/Act Spr  2 Sprays


 INH DAILY as needed for allergies


    9/27/16 Reported


 


 Omega-3 (Fish


 Oil) 1 Ea Cap  1 Cap


 PO DAILY


    9/27/16 Reported


 


 Mobic (Meloxicam)


 15 Mg Tab  1 Tab


 PO HS


   30 9/27/16 Reported


 


 Lisinopril 20 Mg


 Tab  40 Mg


 PO DAILY


    9/27/16 Reported


 


 Ascorbic Acid 500


 Mg Tab  500 Mg


 PO HS


    5/21/13 Reported


 


 Zantac


  (Ranitidine HCl)


 300 Mg Tab  300 Mg


 PO HS


    8/17/11 Reported


 


 Tenormin


  (Atenolol) 50 Mg


 Tab  50 Mg


 PO HS


    10/3/09 Reported











When have you missed any doses of your medications?


   - Uses weekly pillbox.  Denies any missed doses.  Knows what to do if she 

were to miss a dose.





What side effects are you having from your medications?


   - None





What questions do you have about your medications?


   - None





What problems are you having obtaining your medications?


   - No insurance at this time, had to pay cash.  States family is working on 

getting her Rx coverage.  Currently uses [facility]; recommend [facility] 

as they have a $10 list for people without insurance.  Cost may be lower.  

Suggested that she work with PCP/call [facility] for help in converting her 

current Rx to similar drugs available on discount list.





When is your next appointment with your primary care doctor?


   - Today, 6/2/17.  PCP thought she was doing well, set her up with needed 

services.  Needs to make Neurology f/u.





Additional comments:


   - Patient doing well, aware to watch for s/sx bleeding, myalgia, etc.  No 

problems at this time.


   - Seems to be aware of medications she is taking.


   - Aware to prime Flonase if not used >2 weeks as she uses it prn








As per the Pharmacist Discharge Counseling for Stroke Patients Protocol, this 

phone call has been completed within 72 hours of discharge.





Thank you for allowing us to be involved in the care of this patient.

## 2017-11-21 ENCOUNTER — HOSPITAL ENCOUNTER (OUTPATIENT)
Dept: HOSPITAL 45 - C.ACU | Age: 72
Discharge: HOME | End: 2017-11-21
Attending: SURGERY
Payer: COMMERCIAL

## 2017-11-21 VITALS
OXYGEN SATURATION: 100 % | TEMPERATURE: 98.06 F | SYSTOLIC BLOOD PRESSURE: 148 MMHG | HEART RATE: 73 BPM | DIASTOLIC BLOOD PRESSURE: 79 MMHG

## 2017-11-21 VITALS
HEIGHT: 64.02 IN | BODY MASS INDEX: 44.82 KG/M2 | BODY MASS INDEX: 44.82 KG/M2 | HEIGHT: 64.02 IN | WEIGHT: 262.55 LBS | WEIGHT: 262.55 LBS

## 2017-11-21 VITALS
HEART RATE: 71 BPM | DIASTOLIC BLOOD PRESSURE: 69 MMHG | SYSTOLIC BLOOD PRESSURE: 159 MMHG | OXYGEN SATURATION: 99 % | TEMPERATURE: 98.06 F

## 2017-11-21 VITALS — DIASTOLIC BLOOD PRESSURE: 84 MMHG | OXYGEN SATURATION: 97 % | SYSTOLIC BLOOD PRESSURE: 137 MMHG | TEMPERATURE: 97.88 F

## 2017-11-21 VITALS — SYSTOLIC BLOOD PRESSURE: 183 MMHG | OXYGEN SATURATION: 94 % | DIASTOLIC BLOOD PRESSURE: 91 MMHG | HEART RATE: 70 BPM

## 2017-11-21 DIAGNOSIS — D05.11: Primary | ICD-10-CM

## 2017-11-21 DIAGNOSIS — Z82.61: ICD-10-CM

## 2017-11-21 DIAGNOSIS — Z79.899: ICD-10-CM

## 2017-11-21 DIAGNOSIS — Z80.9: ICD-10-CM

## 2017-11-21 DIAGNOSIS — M10.9: ICD-10-CM

## 2017-11-21 DIAGNOSIS — Z88.2: ICD-10-CM

## 2017-11-21 DIAGNOSIS — Z82.49: ICD-10-CM

## 2017-11-21 DIAGNOSIS — Z86.73: ICD-10-CM

## 2017-11-21 DIAGNOSIS — K21.9: ICD-10-CM

## 2017-11-21 DIAGNOSIS — E03.9: ICD-10-CM

## 2017-11-21 DIAGNOSIS — Z80.42: ICD-10-CM

## 2017-11-21 DIAGNOSIS — G47.33: ICD-10-CM

## 2017-11-21 DIAGNOSIS — I10: ICD-10-CM

## 2017-11-21 NOTE — ANESTHESIOLOGY PROGRESS NOTE
Anesthesia Post Op Note


Date & Time


Nov 21, 2017 at 09:47





Vital Signs





Vital Signs Past 12 Hours








  Date Time  Temp Pulse Resp B/P (MAP) Pulse Ox O2 Delivery O2 Flow Rate FiO2


 


11/21/17 09:15 36.6  16 137/84 97 Room Air 0 


 


11/21/17 09:05 36.2 69 16 139/89 95 Nasal Cannula  


 


11/21/17 08:55  66 16 125/88 93 Nasal Cannula  


 


11/21/17 08:45  67 16 147/87 100 Oxymask 10 


 


11/21/17 08:35  67 22 137/68 100 Oxymask 10 


 


11/21/17 08:28 36. 87 22 134/89 100 Oxymask 10 


 


11/21/17 05:40 36.7 71 18 159/69 (99) 99 Room Air  











Notes


Mental Status:  alert / awake / arousable, participated in evaluation


Pt Amnestic to Procedure:  Yes


Nausea / Vomiting:  adequately controlled


Pain:  adequately controlled


Airway Patency, RR, SpO2:  stable & adequate


BP & HR:  stable & adequate


Hydration State:  stable & adequate


Anesthetic Complications:  no major complications apparent

## 2017-11-21 NOTE — [facility] OPERATIVE REPORT
Operative Report


Operative Date


Nov 21, 2017.





Pre-Operative Diagnosis





Ductal carcinoma in situ of right breast





Post-Operative Diagnosis





same as pre-operative





Procedure(s) Performed





Right Breast Re-Excision Ductal Carcinoma In Situ, Partial Mastectomy





Surgeon


Dr. Mikel Ch





Assistant Surgeon(s)


EDITH Looney





Estimated Blood Loss


20ml





Findings


normal remaining





Specimens





Fresh Specime





A: Right breast tissue, 2 long suture anterior, 2 short suture posterior, 1 

long 


suture lateral, 1 short suture superior





Drains


JSOE into cavity





Anesthesia


LMA





Complication(s)


None





Disposition


Recovery Room / PACU





Description of Procedure


After informed consent was obtained the patient was taken to the operating room 

and placed in supine position.  After successful placement of the laryngeal 

mask airway the right breast area was sterilely prepped and draped in usual 

fashion.  I reopened her old incision with 15 blade scalpel.  We made skin 

flaps using electrocautery and carried this down through the remaining breast 

tissue.  I used the inferior flap and went straight down to pectoralis muscle.  

The positive margins were posterior and superior and so I started as far 

posterior as I could get been continued to take down the lump going inferior to 

superior.  After that I created a superior flap and extended this superiorly 

and posteriorly.  Eventually I was again down on top of pectoralis muscle.  I 

then completely amputated the lump and one large specimen.  I marked it such 

that 2 long sutures were anterior 2.  short sutures were posterior 1 short 

suture was superior and one long suture was lateral.  I then thoroughly 

irrigated the wound and controlled any small bleeding points using 

electrocautery.  A final irrigation was performed.  Because of the large cavity 

and placed a 7 flat JOSE drain and brought out through a separate stab incision 

secured to the skin using 3-0 nylon.  [place] powder was placed in the cavity to 

help prevent seroma and hematoma formation and I closed the cavity in multiple 

layers using 0 Vicryl for the deeper layers 2-0 Vicryl for the mid layers and 4-

0 Monocryl for the skin.  Benzoin and Steri-Strips as well as a sterile 

dressing were applied.  The patient was awakened/extubated and transferred 

recovery in stable condition


I attest to the content of the Intraoperative Record and any orders documented 

therein.  Any exceptions are noted below.

## 2017-11-21 NOTE — DISCHARGE INSTRUCTIONS
Discharge Instructions


Date of Service


Nov 21, 2017.





Visit


Reason for Visit:  Right Breast Ductal Carcinoma In Situ





Discharge


Discharge Diagnosis / Problem:  Right Breast Ductal Carcinoma In Situ





Discharge Goals


Goal(s):  Decrease discomfort, Improve function





Activity Recommendations


Activity Limitations:  as noted below


Lifting Limitations:  no more than 10 pounds, until after follow-up appointment


Exercise/Sports Limitations:  gradually increase as tolerated, until after 

follow-up appointment


Shower/Bathe:  tomorrow


Driving or Machine Use:  resume 3 days after discharge (Do not drive while 

using narcotic pain medication.)





Anesthesia


.





Post Anesthesia Instructions:





If you have had General Anesthesia or IV Sedation:





*  Do not drive today.


*  Resume driving when surgeon permits.


*  Do not make important decisions or sign legal documents today.


*  Call surgeon for:





   1.  Temperature elevations greater than 101 degrees F.


   2.  Uncontrollable pain.


   3.  Excessive bleeding.


   4.  Persistent nausea and vomiting.


   5.  Medication intolerance (nausea, vomiting or rash).





*  For nausea and vomiting use only clear liquids such as: tea, soda, bouillon 

until nausea subsides, then gradually increase diet as tolerated.





*  If you have any concerns or questions, call your surgeon's office.  If 

physician is unavailable and it is an emergency, call 911 or go to the nearest 

emergency room.





.





Instructions / Follow-Up


Instructions / Follow-Up


You have dissolvable sutures underneath the incision. These will dissolve on 

their own. You have steri-strips over your incision. Please allow these to fall 

off on their own. You have a drain in place. 


You may resume your plavix tomorrow. 


Please follow-up with Dr. Ch in the office in 1-2 weeks. Please contact 

our office at [phone] to schedule an appointment if you have not done so 

already.


Your incisions will be checked and your drain will be removed at that 

appointment. 


Please feel free to contact us with any questions or concerns at the number 

above. 





[facility] [employer]


[address]


[place] [address].





Diet Recommendations


Recommended Home Diet:  no limitations, resume previous diet





Procedures


Procedures Performed:  


Right Breast Re-Excision Ductal Carcinoma In Situ, Partial Mastectomy





Pending Studies


Studies pending at discharge:  yes


List of pending studies:  


Pathology





Medical Emergencies








.


Who to Call and When:





Medical Emergencies:  If at any time you feel your situation is an emergency, 

please call 911 immediately.





.





Non-Emergent Contact


Non-Emergency issues call your:  Primary Care Provider, Surgeon


Call Non-Emergent contact if:  you have a fever, temperature is above 101.5, 

your pain is not controlled, your pain is worsening, wound has increased 

drainage, wound has increased redness





.


.








"Provider Documentation" section prepared by Miguel Carreon.








.





[place] Drug Monitoring Program


Search Results:  patient reviewed within database, no issues identified

## 2018-01-30 ENCOUNTER — HOSPITAL ENCOUNTER (EMERGENCY)
Dept: HOSPITAL 45 - C.EDB | Age: 73
Discharge: HOME | End: 2018-01-30
Payer: COMMERCIAL

## 2018-01-30 VITALS — OXYGEN SATURATION: 98 % | DIASTOLIC BLOOD PRESSURE: 110 MMHG | SYSTOLIC BLOOD PRESSURE: 169 MMHG | HEART RATE: 64 BPM

## 2018-01-30 VITALS — TEMPERATURE: 98.06 F

## 2018-01-30 DIAGNOSIS — J02.9: ICD-10-CM

## 2018-01-30 DIAGNOSIS — I11.0: ICD-10-CM

## 2018-01-30 DIAGNOSIS — E03.9: ICD-10-CM

## 2018-01-30 DIAGNOSIS — E78.5: ICD-10-CM

## 2018-01-30 DIAGNOSIS — J20.9: Primary | ICD-10-CM

## 2018-01-30 DIAGNOSIS — K12.2: ICD-10-CM

## 2018-01-30 DIAGNOSIS — I50.30: ICD-10-CM

## 2018-01-30 LAB
ALBUMIN SERPL-MCNC: 3.6 GM/DL (ref 3.4–5)
ALP SERPL-CCNC: 43 U/L (ref 45–117)
ALT SERPL-CCNC: 21 U/L (ref 12–78)
AST SERPL-CCNC: 20 U/L (ref 15–37)
BASOPHILS # BLD: 0.08 K/UL (ref 0–0.2)
BASOPHILS NFR BLD: 0.9 %
BUN SERPL-MCNC: 14 MG/DL (ref 7–18)
CALCIUM SERPL-MCNC: 9.1 MG/DL (ref 8.5–10.1)
CO2 SERPL-SCNC: 27 MMOL/L (ref 21–32)
CREAT SERPL-MCNC: 0.81 MG/DL (ref 0.6–1.2)
EOS ABS #: 0.19 K/UL (ref 0–0.5)
EOSINOPHIL NFR BLD AUTO: 217 K/UL (ref 130–400)
GLUCOSE SERPL-MCNC: 100 MG/DL (ref 70–99)
HCT VFR BLD CALC: 44.7 % (ref 37–47)
HGB BLD-MCNC: 15.5 G/DL (ref 12–16)
IG#: 0.03 K/UL (ref 0–0.02)
IMM GRANULOCYTES NFR BLD AUTO: 30.9 %
INFLUENZA B ANTIGEN: (no result)
LIPASE: 192 U/L (ref 73–393)
LYMPHOCYTES # BLD: 2.9 K/UL (ref 1.2–3.4)
MCH RBC QN AUTO: 33.2 PG (ref 25–34)
MCHC RBC AUTO-ENTMCNC: 34.7 G/DL (ref 32–36)
MCV RBC AUTO: 95.7 FL (ref 80–100)
MONO ABS #: 1.14 K/UL (ref 0.11–0.59)
MONOCYTES NFR BLD: 12.1 %
NEUT ABS #: 5.06 K/UL (ref 1.4–6.5)
NEUTROPHILS # BLD AUTO: 2 %
NEUTROPHILS NFR BLD AUTO: 53.8 %
PMV BLD AUTO: 10.5 FL (ref 7.4–10.4)
POTASSIUM SERPL-SCNC: 3.8 MMOL/L (ref 3.5–5.1)
PROT SERPL-MCNC: 7.4 GM/DL (ref 6.4–8.2)
RED CELL DISTRIBUTION WIDTH CV: 13.4 % (ref 11.5–14.5)
RED CELL DISTRIBUTION WIDTH SD: 46.1 FL (ref 36.4–46.3)
SODIUM SERPL-SCNC: 140 MMOL/L (ref 136–145)
WBC # BLD AUTO: 9.4 K/UL (ref 4.8–10.8)

## 2018-01-30 NOTE — DIAGNOSTIC IMAGING REPORT
CHEST ONE VIEW PORTABLE



CLINICAL HISTORY: 72 years-old Female presenting with CHEST PAIN. 



TECHNIQUE: Portable upright AP view of the chest was obtained.



COMPARISON: 5/26/2017.



FINDINGS:

Atherosclerosis of aortic arch. Cardiac silhouette enlarged. Lungs and pleural

spaces clear. Degenerative changes of the thoracic spine. Upper abdomen normal.



IMPRESSION:

1.  Cardiomegaly. Otherwise no acute cardiopulmonary disease.







Electronically signed by:  Arian Hernandes M.D.

1/30/2018 11:14 AM



Dictated Date/Time:  1/30/2018 11:14 AM

## 2018-02-20 ENCOUNTER — HOSPITAL ENCOUNTER (OUTPATIENT)
Dept: HOSPITAL 45 - C.MAMM | Age: 73
Discharge: HOME | End: 2018-02-20
Attending: INTERNAL MEDICINE
Payer: COMMERCIAL

## 2018-02-20 DIAGNOSIS — M85.852: ICD-10-CM

## 2018-02-20 DIAGNOSIS — D05.11: ICD-10-CM

## 2018-02-20 DIAGNOSIS — M85.851: Primary | ICD-10-CM
